# Patient Record
Sex: FEMALE | Race: BLACK OR AFRICAN AMERICAN | NOT HISPANIC OR LATINO | Employment: FULL TIME | URBAN - METROPOLITAN AREA
[De-identification: names, ages, dates, MRNs, and addresses within clinical notes are randomized per-mention and may not be internally consistent; named-entity substitution may affect disease eponyms.]

---

## 2020-04-22 ENCOUNTER — OFFICE VISIT (OUTPATIENT)
Dept: FAMILY MEDICINE | Facility: CLINIC | Age: 65
End: 2020-04-22
Payer: MEDICARE

## 2020-04-22 VITALS
SYSTOLIC BLOOD PRESSURE: 148 MMHG | WEIGHT: 161 LBS | BODY MASS INDEX: 25.88 KG/M2 | OXYGEN SATURATION: 99 % | HEART RATE: 87 BPM | TEMPERATURE: 98 F | HEIGHT: 66 IN | DIASTOLIC BLOOD PRESSURE: 100 MMHG

## 2020-04-22 DIAGNOSIS — I10 ESSENTIAL HYPERTENSION: Primary | ICD-10-CM

## 2020-04-22 DIAGNOSIS — Z11.59 NEED FOR HEPATITIS C SCREENING TEST: ICD-10-CM

## 2020-04-22 PROCEDURE — 99203 PR OFFICE/OUTPT VISIT, NEW, LEVL III, 30-44 MIN: ICD-10-PCS | Mod: S$GLB,,, | Performed by: NURSE PRACTITIONER

## 2020-04-22 PROCEDURE — 99203 OFFICE O/P NEW LOW 30 MIN: CPT | Mod: S$GLB,,, | Performed by: NURSE PRACTITIONER

## 2020-04-22 RX ORDER — LOSARTAN POTASSIUM AND HYDROCHLOROTHIAZIDE 12.5; 1 MG/1; MG/1
1 TABLET ORAL DAILY
COMMUNITY
Start: 2020-01-20 | End: 2020-04-22 | Stop reason: SDUPTHER

## 2020-04-22 RX ORDER — LOSARTAN POTASSIUM AND HYDROCHLOROTHIAZIDE 12.5; 1 MG/1; MG/1
1 TABLET ORAL DAILY
Qty: 30 TABLET | Refills: 1 | Status: SHIPPED | OUTPATIENT
Start: 2020-04-22 | End: 2020-05-11

## 2020-04-22 NOTE — PROGRESS NOTES
SUBJECTIVE:      Patient ID: Delgado Espinoza is a 64 y.o. female.    Chief Complaint: No chief complaint on file.    Patient is here today to establish care. She has a history of htn. Has not been seen by a PCP in 2 years. She was on blood pressure medication in the past. Lost weight and did not feel she needed the medication anymore. She was monitoring her blood pressure at home. Over the past 6 months she gained a lot of her weight back and noticed her blood pressure was running high. Went to an urgent care 3 months ago and was prescribed hyzaar which is what she was on in the past. She has been out of the medication for 3 days and needs refills. She is due for her pap, mammo and DEXA. Also due for routine labs    Hypertension   This is a chronic problem. The current episode started more than 1 year ago. Pertinent negatives include no anxiety, chest pain, headaches, malaise/fatigue, orthopnea, palpitations, peripheral edema or shortness of breath. Past treatments include angiotensin blockers and diuretics. The current treatment provides moderate improvement.       Past Surgical History:   Procedure Laterality Date    BREAST SURGERY      MYOMECTOMY      RIGHT OOPHORECTOMY       Family History   Problem Relation Age of Onset    Stroke Mother       Social History     Socioeconomic History    Marital status: Single     Spouse name: Not on file    Number of children: Not on file    Years of education: Not on file    Highest education level: Not on file   Occupational History    Not on file   Social Needs    Financial resource strain: Not on file    Food insecurity:     Worry: Not on file     Inability: Not on file    Transportation needs:     Medical: Not on file     Non-medical: Not on file   Tobacco Use    Smoking status: Never Smoker    Smokeless tobacco: Never Used   Substance and Sexual Activity    Alcohol use: Yes     Comment: socially    Drug use: No    Sexual activity: Not on file    Lifestyle    Physical activity:     Days per week: Not on file     Minutes per session: Not on file    Stress: Not on file   Relationships    Social connections:     Talks on phone: Not on file     Gets together: Not on file     Attends Mandaeism service: Not on file     Active member of club or organization: Not on file     Attends meetings of clubs or organizations: Not on file     Relationship status: Not on file   Other Topics Concern    Not on file   Social History Narrative    Not on file     Current Outpatient Medications   Medication Sig Dispense Refill    aspirin 81 MG Chew Take 81 mg by mouth once daily.      calcium-vitamin D3 (CALCIUM 500 + D) 500 mg(1,250mg) -200 unit per tablet Take 1 tablet by mouth 2 (two) times daily with meals.      fish oil-omega-3 fatty acids 300-1,000 mg capsule Take 2 g by mouth once daily.      losartan-hydrochlorothiazide 100-12.5 mg (HYZAAR) 100-12.5 mg Tab Take 1 tablet by mouth once daily. 30 tablet 1     No current facility-administered medications for this visit.      Review of patient's allergies indicates:  No Known Allergies   Past Medical History:   Diagnosis Date    Hypertension      Past Surgical History:   Procedure Laterality Date    BREAST SURGERY      MYOMECTOMY      RIGHT OOPHORECTOMY         Review of Systems   Constitutional: Negative for appetite change, chills, diaphoresis, malaise/fatigue and unexpected weight change.   HENT: Negative for ear discharge, hearing loss, trouble swallowing and voice change.    Eyes: Negative for photophobia and pain.   Respiratory: Negative for chest tightness, shortness of breath and stridor.    Cardiovascular: Negative for chest pain, palpitations and orthopnea.   Gastrointestinal: Negative for abdominal pain, blood in stool and vomiting.   Endocrine: Negative for cold intolerance and heat intolerance.   Genitourinary: Negative for difficulty urinating and flank pain.   Musculoskeletal: Negative for  "arthralgias, joint swelling and neck stiffness.   Skin: Negative for pallor.   Neurological: Negative for dizziness, speech difficulty, weakness, light-headedness and headaches.   Hematological: Does not bruise/bleed easily.   Psychiatric/Behavioral: Negative for confusion, dysphoric mood and sleep disturbance. The patient is not nervous/anxious.       OBJECTIVE:      Vitals:    04/22/20 1442 04/22/20 1519   BP: (!) 200/108 (!) 148/100   Pulse: 87    Temp: 98.4 °F (36.9 °C)    TempSrc: Oral    SpO2: 99%    Weight: 73 kg (161 lb)    Height: 5' 6" (1.676 m)      Physical Exam   Constitutional: She is oriented to person, place, and time. She appears well-developed and well-nourished.   HENT:   Head: Atraumatic.   Eyes: Conjunctivae are normal.   Neck: Neck supple. No JVD present. Carotid bruit is not present. No thyromegaly present.   Cardiovascular: Normal rate, regular rhythm, normal heart sounds and intact distal pulses.   Pulmonary/Chest: Effort normal and breath sounds normal. She has no wheezes. She has no rhonchi. She has no rales.   Abdominal: Soft. Bowel sounds are normal. She exhibits no distension. There is no tenderness.   Musculoskeletal: Normal range of motion. She exhibits no edema.   Neurological: She is alert and oriented to person, place, and time.   Skin: Skin is warm and dry.   Psychiatric: She has a normal mood and affect. Her speech is normal and behavior is normal. Thought content normal.   Nursing note and vitals reviewed.     Assessment:       1. Essential hypertension    2. Need for hepatitis C screening test        Plan:       Essential hypertension  -     CBC auto differential; Future; Expected date: 04/22/2020  -     Comprehensive metabolic panel; Future; Expected date: 04/22/2020  -     Lipid panel; Future; Expected date: 04/22/2020  -     TSH; Future; Expected date: 04/22/2020  -     Urinalysis; Future; Expected date: 04/22/2020  -     losartan-hydrochlorothiazide 100-12.5 mg (HYZAAR) " 100-12.5 mg Tab; Take 1 tablet by mouth once daily.  Dispense: 30 tablet; Refill: 1    Need for hepatitis C screening test  -     Hepatitis C antibody; Future; Expected date: 04/22/2020    Patient states she would like to wait on her mammo and dexa until later in the year. She will look up physicians for GYN and let me know at her f/u visit who she prefers to see    Follow up in about 4 weeks (around 5/20/2020) for htn .      4/22/2020 KENNEY Castillo, FNP

## 2020-04-22 NOTE — PATIENT INSTRUCTIONS
Established High Blood Pressure    High blood pressure (hypertension) is a chronic disease. Often, healthcare providers dont know what causes it. But it can be caused by certain health conditions and medicines.  If you have high blood pressure, you may not have any symptoms. If you do have symptoms, they may include headache, dizziness, changes in your vision, chest pain, and shortness of breath. But even without symptoms, high blood pressure thats not treated raises your risk for heart attack and stroke. High blood pressure is a serious health risk and shouldnt be ignored.  A blood pressure reading is made up of two numbers: a higher number over a lower number. The top number is the systolic pressure. The bottom number is the diastolic pressure. A normal blood pressure is a systolic pressure of  less than 120 over a diastolic pressure of less than 80. You will see your blood pressure readings written together. For example, a person with a systolic pressure of 188 and a diastolic pressure of 78 will have 118/78 written in the medical record.  High blood pressure is when either the top number is 140 or higher, or the bottom number is 90 or higher. This must be the result when taking your blood pressure a number of times. The blood pressures between normal and high are called prehypertension.  Home care  If you have high blood pressure, you should do what is listed below to lower your blood pressure. If you are taking medicines for high blood pressure, these methods may reduce or end your need for medicines in the future.  · Begin a weight-loss program if you are overweight.  · Cut back on how much salt you get in your diet. Heres how to do this:  ¨ Dont eat foods that have a lot of salt. These include olives, pickles, smoked meats, and salted potato chips.  ¨ Dont add salt to your food at the table.  ¨ Use only small amounts of salt when cooking.  · Start an exercise program. Talk with your healthcare  provider about the type of exercise program that would be best for you. It doesn't have to be hard. Even brisk walking for 20 minutes 3 times a week is a good form of exercise.  · Dont take medicines that stimulate the heart. This includes many over-the-counter cold and sinus decongestant pills and sprays, as well as diet pills. Check the warnings about hypertension on the label. Before buying any over-the-counter medicines or supplements, always ask the pharmacist about the product's potential interaction with your high blood pressure and your high blood pressure medicines.  · Stimulants such as amphetamine or cocaine could be deadly for someone with high blood pressure. Never take these.  · Limit how much caffeine you get in your diet. Switch to caffeine-free products.  · Stop smoking. If you are a long-time smoker, this can be hard. Talk to your healthcare provider about medicines and nicotine replacement options to help you. Also, enroll in a stop-smoking program to make it more likely that you will quit for good.  · Learn how to handle stress. This is an important part of any program to lower blood pressure. Learn about relaxation methods like meditation, yoga, or biofeedback.  · If your provider prescribed medicines, take them exactly as directed. Missing doses may cause your blood pressure get out of control.  · If you miss a dose or doses, check with your healthcare provider or pharmacist about what to do.  · Consider buying an automatic blood pressure machine. Ask your provider for a recommendation. You can get one of these at most pharmacies.     The American Heart Association recommends the following guidelines for home blood pressure monitoring:  · Don't smoke or drink coffee for 30 minutes before taking your blood pressure.  · Go to the bathroom before the test.  · Relax for 5 minutes before taking the measurement.  · Sit with your back supported (don't sit on a couch or soft chair); keep your feet on  the floor uncrossed. Place your arm on a solid flat surface (like a table) with the upper part of the arm at heart level. Place the middle of the cuff directly above the eye of the elbow. Check the monitor's instruction manual for an illustration.  · Take multiple readings. When you measure, take 2 to 3 readings one minute apart and record all of the results.  · Take your blood pressure at the same time every day, or as your healthcare provider recommends.  · Record the date, time, and blood pressure reading.  · Take the record with you to your next medical appointment. If your blood pressure monitor has a built-in memory, simply take the monitor with you to your next appointment.  · Call your provider if you have several high readings. Don't be frightened by a single high blood pressure reading, but if you get several high readings, check in with your healthcare provider.  · Note: When blood pressure reaches a systolic (top number) of 180 or higher OR diastolic (bottom number) of 110 or higher, seek emergency medical treatment.  Follow-up care  You will need to see your healthcare provider regularly. This is to check your blood pressure and to make changes to your medicines. Make a follow-up appointment as directed. Bring the record of your home blood pressure readings to the appointment.  When to seek medical advice  Call your healthcare provider right away if any of these occur:  · Blood pressure reaches a systolic (upper number) of 180 or higher OR a diastolic (bottom number) of 110 or higher  · Chest pain or shortness of breath  · Severe headache  · Throbbing or rushing sound in the ears  · Nosebleed  · Sudden severe pain in your belly (abdomen)  · Extreme drowsiness, confusion, or fainting  · Dizziness or spinning sensation (vertigo)  · Weakness of an arm or leg or one side of the face  · You have problems speaking or seeing   Date Last Reviewed: 12/1/2016  © 4148-8882 [employer]. [address] [place] [address]  [place] [address]. All rights reserved. This information is not intended as a substitute for professional medical care. Always follow your healthcare professional's instructions.

## 2020-05-07 ENCOUNTER — TELEPHONE (OUTPATIENT)
Dept: FAMILY MEDICINE | Facility: CLINIC | Age: 65
End: 2020-05-07

## 2020-05-07 DIAGNOSIS — I10 ESSENTIAL HYPERTENSION: Primary | ICD-10-CM

## 2020-05-11 RX ORDER — LOSARTAN POTASSIUM AND HYDROCHLOROTHIAZIDE 25; 100 MG/1; MG/1
1 TABLET ORAL DAILY
Qty: 30 TABLET | Refills: 0 | Status: SHIPPED | OUTPATIENT
Start: 2020-05-11 | End: 2020-06-04

## 2020-05-11 NOTE — TELEPHONE ENCOUNTER
Pt said yes she agrees with changing blood pressure medication to 100/25mg.  Please send to local pharmacy.

## 2020-05-15 LAB
ALBUMIN SERPL-MCNC: 4.3 G/DL (ref 3.6–5.1)
ALBUMIN/GLOB SERPL: 1.3 (CALC) (ref 1–2.5)
ALP SERPL-CCNC: 88 U/L (ref 37–153)
ALT SERPL-CCNC: 15 U/L (ref 6–29)
APPEARANCE UR: CLEAR
AST SERPL-CCNC: 20 U/L (ref 10–35)
BASOPHILS # BLD AUTO: 51 CELLS/UL (ref 0–200)
BASOPHILS NFR BLD AUTO: 1.1 %
BILIRUB SERPL-MCNC: 0.4 MG/DL (ref 0.2–1.2)
BILIRUB UR QL STRIP: NEGATIVE
BUN SERPL-MCNC: 19 MG/DL (ref 7–25)
BUN/CREAT SERPL: NORMAL (CALC) (ref 6–22)
CALCIUM SERPL-MCNC: 10.4 MG/DL (ref 8.6–10.4)
CHLORIDE SERPL-SCNC: 103 MMOL/L (ref 98–110)
CHOLEST SERPL-MCNC: 171 MG/DL
CHOLEST/HDLC SERPL: 2.9 (CALC)
CO2 SERPL-SCNC: 29 MMOL/L (ref 20–32)
COLOR UR: YELLOW
CREAT SERPL-MCNC: 0.92 MG/DL (ref 0.5–0.99)
EOSINOPHIL # BLD AUTO: 557 CELLS/UL (ref 15–500)
EOSINOPHIL NFR BLD AUTO: 12.1 %
ERYTHROCYTE [DISTWIDTH] IN BLOOD BY AUTOMATED COUNT: 14.3 % (ref 11–15)
GFRSERPLBLD MDRD-ARVRAT: 65 ML/MIN/1.73M2
GLOBULIN SER CALC-MCNC: 3.3 G/DL (CALC) (ref 1.9–3.7)
GLUCOSE SERPL-MCNC: 87 MG/DL (ref 65–99)
GLUCOSE UR QL STRIP: NEGATIVE
HCT VFR BLD AUTO: 41.8 % (ref 35–45)
HCV AB S/CO SERPL IA: 0.01
HCV AB SERPL QL IA: NORMAL
HDLC SERPL-MCNC: 60 MG/DL
HGB BLD-MCNC: 13.1 G/DL (ref 11.7–15.5)
HGB UR QL STRIP: NEGATIVE
KETONES UR QL STRIP: NEGATIVE
LDLC SERPL CALC-MCNC: 97 MG/DL (CALC)
LEUKOCYTE ESTERASE UR QL STRIP: NEGATIVE
LYMPHOCYTES # BLD AUTO: 1431 CELLS/UL (ref 850–3900)
LYMPHOCYTES NFR BLD AUTO: 31.1 %
MCH RBC QN AUTO: 25.5 PG (ref 27–33)
MCHC RBC AUTO-ENTMCNC: 31.3 G/DL (ref 32–36)
MCV RBC AUTO: 81.5 FL (ref 80–100)
MONOCYTES # BLD AUTO: 396 CELLS/UL (ref 200–950)
MONOCYTES NFR BLD AUTO: 8.6 %
NEUTROPHILS # BLD AUTO: 2167 CELLS/UL (ref 1500–7800)
NEUTROPHILS NFR BLD AUTO: 47.1 %
NITRITE UR QL STRIP: NEGATIVE
NONHDLC SERPL-MCNC: 111 MG/DL (CALC)
PH UR STRIP: 6.5 [PH] (ref 5–8)
PLATELET # BLD AUTO: 225 THOUSAND/UL (ref 140–400)
PMV BLD REES-ECKER: 12.6 FL (ref 7.5–12.5)
POTASSIUM SERPL-SCNC: 4 MMOL/L (ref 3.5–5.3)
PROT SERPL-MCNC: 7.6 G/DL (ref 6.1–8.1)
PROT UR QL STRIP: NEGATIVE
RBC # BLD AUTO: 5.13 MILLION/UL (ref 3.8–5.1)
SODIUM SERPL-SCNC: 140 MMOL/L (ref 135–146)
SP GR UR STRIP: 1.01 (ref 1–1.03)
TRIGL SERPL-MCNC: 48 MG/DL
TSH SERPL-ACNC: 3.66 MIU/L (ref 0.4–4.5)
WBC # BLD AUTO: 4.6 THOUSAND/UL (ref 3.8–10.8)

## 2020-06-04 DIAGNOSIS — I10 ESSENTIAL HYPERTENSION: ICD-10-CM

## 2020-06-04 RX ORDER — LOSARTAN POTASSIUM AND HYDROCHLOROTHIAZIDE 25; 100 MG/1; MG/1
TABLET ORAL
Qty: 30 TABLET | Refills: 0 | Status: SHIPPED | OUTPATIENT
Start: 2020-06-04 | End: 2020-06-11 | Stop reason: SDUPTHER

## 2020-06-11 ENCOUNTER — OFFICE VISIT (OUTPATIENT)
Dept: FAMILY MEDICINE | Facility: CLINIC | Age: 65
End: 2020-06-11
Payer: MEDICARE

## 2020-06-11 VITALS
WEIGHT: 160.38 LBS | SYSTOLIC BLOOD PRESSURE: 130 MMHG | HEART RATE: 65 BPM | HEIGHT: 66 IN | TEMPERATURE: 98 F | OXYGEN SATURATION: 98 % | BODY MASS INDEX: 25.78 KG/M2 | DIASTOLIC BLOOD PRESSURE: 88 MMHG

## 2020-06-11 DIAGNOSIS — I10 ESSENTIAL HYPERTENSION: Primary | ICD-10-CM

## 2020-06-11 DIAGNOSIS — M85.80 OSTEOPENIA, UNSPECIFIED LOCATION: ICD-10-CM

## 2020-06-11 DIAGNOSIS — Z78.0 POST-MENOPAUSAL: ICD-10-CM

## 2020-06-11 DIAGNOSIS — Z12.31 ENCOUNTER FOR SCREENING MAMMOGRAM FOR BREAST CANCER: ICD-10-CM

## 2020-06-11 PROCEDURE — 99213 OFFICE O/P EST LOW 20 MIN: CPT | Mod: S$GLB,,, | Performed by: NURSE PRACTITIONER

## 2020-06-11 PROCEDURE — 99213 PR OFFICE/OUTPT VISIT, EST, LEVL III, 20-29 MIN: ICD-10-PCS | Mod: S$GLB,,, | Performed by: NURSE PRACTITIONER

## 2020-06-11 RX ORDER — LOSARTAN POTASSIUM AND HYDROCHLOROTHIAZIDE 25; 100 MG/1; MG/1
1 TABLET ORAL DAILY
Qty: 90 TABLET | Refills: 1 | Status: SHIPPED | OUTPATIENT
Start: 2020-06-11 | End: 2020-12-10 | Stop reason: SDUPTHER

## 2020-06-11 NOTE — PATIENT INSTRUCTIONS
Established High Blood Pressure    High blood pressure (hypertension) is a chronic disease. Often, healthcare providers dont know what causes it. But it can be caused by certain health conditions and medicines.  If you have high blood pressure, you may not have any symptoms. If you do have symptoms, they may include headache, dizziness, changes in your vision, chest pain, and shortness of breath. But even without symptoms, high blood pressure thats not treated raises your risk for heart attack and stroke. High blood pressure is a serious health risk and shouldnt be ignored.  A blood pressure reading is made up of two numbers: a higher number over a lower number. The top number is the systolic pressure. The bottom number is the diastolic pressure. A normal blood pressure is a systolic pressure of  less than 120 over a diastolic pressure of less than 80. You will see your blood pressure readings written together. For example, a person with a systolic pressure of 188 and a diastolic pressure of 78 will have 118/78 written in the medical record.  High blood pressure is when either the top number is 140 or higher, or the bottom number is 90 or higher. This must be the result when taking your blood pressure a number of times. The blood pressures between normal and high are called prehypertension.  Home care  If you have high blood pressure, you should do what is listed below to lower your blood pressure. If you are taking medicines for high blood pressure, these methods may reduce or end your need for medicines in the future.  · Begin a weight-loss program if you are overweight.  · Cut back on how much salt you get in your diet. Heres how to do this:  ¨ Dont eat foods that have a lot of salt. These include olives, pickles, smoked meats, and salted potato chips.  ¨ Dont add salt to your food at the table.  ¨ Use only small amounts of salt when cooking.  · Start an exercise program. Talk with your healthcare  provider about the type of exercise program that would be best for you. It doesn't have to be hard. Even brisk walking for 20 minutes 3 times a week is a good form of exercise.  · Dont take medicines that stimulate the heart. This includes many over-the-counter cold and sinus decongestant pills and sprays, as well as diet pills. Check the warnings about hypertension on the label. Before buying any over-the-counter medicines or supplements, always ask the pharmacist about the product's potential interaction with your high blood pressure and your high blood pressure medicines.  · Stimulants such as amphetamine or cocaine could be deadly for someone with high blood pressure. Never take these.  · Limit how much caffeine you get in your diet. Switch to caffeine-free products.  · Stop smoking. If you are a long-time smoker, this can be hard. Talk to your healthcare provider about medicines and nicotine replacement options to help you. Also, enroll in a stop-smoking program to make it more likely that you will quit for good.  · Learn how to handle stress. This is an important part of any program to lower blood pressure. Learn about relaxation methods like meditation, yoga, or biofeedback.  · If your provider prescribed medicines, take them exactly as directed. Missing doses may cause your blood pressure get out of control.  · If you miss a dose or doses, check with your healthcare provider or pharmacist about what to do.  · Consider buying an automatic blood pressure machine. Ask your provider for a recommendation. You can get one of these at most pharmacies.     The American Heart Association recommends the following guidelines for home blood pressure monitoring:  · Don't smoke or drink coffee for 30 minutes before taking your blood pressure.  · Go to the bathroom before the test.  · Relax for 5 minutes before taking the measurement.  · Sit with your back supported (don't sit on a couch or soft chair); keep your feet on  the floor uncrossed. Place your arm on a solid flat surface (like a table) with the upper part of the arm at heart level. Place the middle of the cuff directly above the eye of the elbow. Check the monitor's instruction manual for an illustration.  · Take multiple readings. When you measure, take 2 to 3 readings one minute apart and record all of the results.  · Take your blood pressure at the same time every day, or as your healthcare provider recommends.  · Record the date, time, and blood pressure reading.  · Take the record with you to your next medical appointment. If your blood pressure monitor has a built-in memory, simply take the monitor with you to your next appointment.  · Call your provider if you have several high readings. Don't be frightened by a single high blood pressure reading, but if you get several high readings, check in with your healthcare provider.  · Note: When blood pressure reaches a systolic (top number) of 180 or higher OR diastolic (bottom number) of 110 or higher, seek emergency medical treatment.  Follow-up care  You will need to see your healthcare provider regularly. This is to check your blood pressure and to make changes to your medicines. Make a follow-up appointment as directed. Bring the record of your home blood pressure readings to the appointment.  When to seek medical advice  Call your healthcare provider right away if any of these occur:  · Blood pressure reaches a systolic (upper number) of 180 or higher OR a diastolic (bottom number) of 110 or higher  · Chest pain or shortness of breath  · Severe headache  · Throbbing or rushing sound in the ears  · Nosebleed  · Sudden severe pain in your belly (abdomen)  · Extreme drowsiness, confusion, or fainting  · Dizziness or spinning sensation (vertigo)  · Weakness of an arm or leg or one side of the face  · You have problems speaking or seeing   Date Last Reviewed: 12/1/2016  © 0419-9161 [employer]. [address] [place] [address]  [place] [address]. All rights reserved. This information is not intended as a substitute for professional medical care. Always follow your healthcare professional's instructions.

## 2020-06-11 NOTE — PROGRESS NOTES
SUBJECTIVE:      Patient ID: Delgado Espinoza is a 64 y.o. female.    Chief Complaint: Hypertension    Patient is here today to f/u on htn and review labs. She is doing well on her current meds, no complaints    Hypertension   This is a chronic problem. The current episode started more than 1 year ago. The problem has been gradually improving since onset. The problem is controlled. Pertinent negatives include no anxiety, chest pain, headaches, malaise/fatigue, orthopnea, palpitations, peripheral edema or shortness of breath. Past treatments include angiotensin blockers and diuretics. The current treatment provides moderate improvement.       Past Surgical History:   Procedure Laterality Date    BREAST SURGERY      MYOMECTOMY      RIGHT OOPHORECTOMY       Family History   Problem Relation Age of Onset    Stroke Mother       Social History     Socioeconomic History    Marital status: Single     Spouse name: Not on file    Number of children: Not on file    Years of education: Not on file    Highest education level: Not on file   Occupational History    Not on file   Social Needs    Financial resource strain: Not on file    Food insecurity:     Worry: Not on file     Inability: Not on file    Transportation needs:     Medical: Not on file     Non-medical: Not on file   Tobacco Use    Smoking status: Never Smoker    Smokeless tobacco: Never Used   Substance and Sexual Activity    Alcohol use: Yes     Comment: socially    Drug use: No    Sexual activity: Not on file   Lifestyle    Physical activity:     Days per week: Not on file     Minutes per session: Not on file    Stress: Not on file   Relationships    Social connections:     Talks on phone: Not on file     Gets together: Not on file     Attends Spiritism service: Not on file     Active member of club or organization: Not on file     Attends meetings of clubs or organizations: Not on file     Relationship status: Not on file   Other Topics  Concern    Not on file   Social History Narrative    Not on file     Current Outpatient Medications   Medication Sig Dispense Refill    aspirin 81 MG Chew Take 81 mg by mouth once daily.      calcium-vitamin D3 (CALCIUM 500 + D) 500 mg(1,250mg) -200 unit per tablet Take 1 tablet by mouth 2 (two) times daily with meals.      fish oil-omega-3 fatty acids 300-1,000 mg capsule Take 2 g by mouth once daily.      losartan-hydrochlorothiazide 100-25 mg (HYZAAR) 100-25 mg per tablet Take 1 tablet by mouth once daily. 90 tablet 1     No current facility-administered medications for this visit.      Review of patient's allergies indicates:  No Known Allergies   Past Medical History:   Diagnosis Date    Hypertension      Past Surgical History:   Procedure Laterality Date    BREAST SURGERY      MYOMECTOMY      RIGHT OOPHORECTOMY         Review of Systems   Constitutional: Negative for appetite change, chills, diaphoresis, malaise/fatigue and unexpected weight change.   HENT: Negative for ear discharge, hearing loss, trouble swallowing and voice change.    Eyes: Negative for photophobia and pain.   Respiratory: Negative for chest tightness, shortness of breath and stridor.    Cardiovascular: Negative for chest pain, palpitations and orthopnea.   Gastrointestinal: Negative for abdominal pain, blood in stool and vomiting.   Endocrine: Negative for cold intolerance and heat intolerance.   Genitourinary: Negative for difficulty urinating and flank pain.   Musculoskeletal: Negative for joint swelling and neck stiffness.   Skin: Negative for pallor.   Neurological: Negative for dizziness, speech difficulty, weakness and headaches.   Hematological: Does not bruise/bleed easily.   Psychiatric/Behavioral: Negative for confusion and dysphoric mood. The patient is not nervous/anxious.       OBJECTIVE:      Vitals:    06/11/20 1329 06/11/20 1400   BP: (!) 146/90 130/88   Pulse: 65    Temp: 98.4 °F (36.9 °C)    TempSrc: Oral   "  SpO2: 98%    Weight: 72.8 kg (160 lb 6.4 oz)    Height: 5' 6" (1.676 m)      Physical Exam   Constitutional: She is oriented to person, place, and time. She appears well-developed and well-nourished.   HENT:   Head: Atraumatic.   Eyes: Conjunctivae are normal.   Neck: Neck supple. No JVD present. No thyromegaly present.   Cardiovascular: Normal rate, regular rhythm, normal heart sounds and intact distal pulses.   Pulmonary/Chest: Effort normal and breath sounds normal.   Abdominal: Soft. Bowel sounds are normal. She exhibits no distension. There is no tenderness.   Musculoskeletal: Normal range of motion. She exhibits no edema.   Neurological: She is alert and oriented to person, place, and time.   Skin: Skin is warm and dry.   Psychiatric: She has a normal mood and affect. Her behavior is normal. Thought content normal.   Nursing note and vitals reviewed.      No visits with results within 1 Month(s) from this visit.   Latest known visit with results is:   Office Visit on 04/22/2020   Component Date Value Ref Range Status    WBC 05/14/2020 4.6  3.8 - 10.8 Thousand/uL Final    RBC 05/14/2020 5.13* 3.80 - 5.10 Million/uL Final    Hemoglobin 05/14/2020 13.1  11.7 - 15.5 g/dL Final    Hematocrit 05/14/2020 41.8  35.0 - 45.0 % Final    Mean Corpuscular Volume 05/14/2020 81.5  80.0 - 100.0 fL Final    Mean Corpuscular Hemoglobin 05/14/2020 25.5* 27.0 - 33.0 pg Final    Mean Corpuscular Hemoglobin Conc 05/14/2020 31.3* 32.0 - 36.0 g/dL Final    RDW 05/14/2020 14.3  11.0 - 15.0 % Final    Platelets 05/14/2020 225  140 - 400 Thousand/uL Final    MPV 05/14/2020 12.6* 7.5 - 12.5 fL Final    Neutrophils Absolute 05/14/2020 2,167  1,500 - 7,800 cells/uL Final    Lymph # 05/14/2020 1,431  850 - 3,900 cells/uL Final    Mono # 05/14/2020 396  200 - 950 cells/uL Final    Eos # 05/14/2020 557* 15 - 500 cells/uL Final    Baso # 05/14/2020 51  0 - 200 cells/uL Final    Neutrophils Relative 05/14/2020 47.1  % Final "    Lymph% 05/14/2020 31.1  % Final    Mono% 05/14/2020 8.6  % Final    Eosinophil% 05/14/2020 12.1  % Final    Basophil% 05/14/2020 1.1  % Final    Glucose 05/14/2020 87  65 - 99 mg/dL Final    Comment:               Fasting reference interval         BUN, Bld 05/14/2020 19  7 - 25 mg/dL Final    Creatinine 05/14/2020 0.92  0.50 - 0.99 mg/dL Final    Comment: For patients >49 years of age, the reference limit  for Creatinine is approximately 13% higher for people  identified as -American.         eGFR if non African American 05/14/2020 65  > OR = 60 mL/min/1.73m2 Final    eGFR if African American 05/14/2020 76  > OR = 60 mL/min/1.73m2 Final    BUN/Creatinine Ratio 05/14/2020 NOT APPLICABLE  6 - 22 (calc) Final    Sodium 05/14/2020 140  135 - 146 mmol/L Final    Potassium 05/14/2020 4.0  3.5 - 5.3 mmol/L Final    Chloride 05/14/2020 103  98 - 110 mmol/L Final    CO2 05/14/2020 29  20 - 32 mmol/L Final    Calcium 05/14/2020 10.4  8.6 - 10.4 mg/dL Final    Total Protein 05/14/2020 7.6  6.1 - 8.1 g/dL Final    Albumin 05/14/2020 4.3  3.6 - 5.1 g/dL Final    Globulin, Total 05/14/2020 3.3  1.9 - 3.7 g/dL (calc) Final    Albumin/Globulin Ratio 05/14/2020 1.3  1.0 - 2.5 (calc) Final    Total Bilirubin 05/14/2020 0.4  0.2 - 1.2 mg/dL Final    Alkaline Phosphatase 05/14/2020 88  37 - 153 U/L Final    AST 05/14/2020 20  10 - 35 U/L Final    ALT 05/14/2020 15  6 - 29 U/L Final    TSH 05/14/2020 3.66  0.40 - 4.50 mIU/L Final    Hepatitis C Ab 05/14/2020 NON-REACTIVE  NON-REACTIVE Final    Signal/Cutoff 05/14/2020 0.01  <1.00 Final    Comment:    HCV antibody was non-reactive. There is no laboratory   evidence of HCV infection.     In most cases, no further action is required. However,  if recent HCV exposure is suspected, a test for HCV RNA  (test code 52898) is suggested.     For additional information please refer to  http://education.[employer]/faq/SXQ57z8  (This link is being  provided for informational/  educational purposes only.)         Cholesterol 05/14/2020 171  <200 mg/dL Final    HDL 05/14/2020 60  > OR = 50 mg/dL Final    Triglycerides 05/14/2020 48  <150 mg/dL Final    LDL Cholesterol 05/14/2020 97  mg/dL (calc) Final    Comment: Reference range: <100     Desirable range <100 mg/dL for primary prevention;    <70 mg/dL for patients with CHD or diabetic patients   with > or = 2 CHD risk factors.     LDL-C is now calculated using the Kentrell   calculation, which is a validated novel method providing   better accuracy than the Friedewald equation in the   estimation of LDL-C.   Luis Armando BRENNER et al. GUI. 2013;310(19): 5518-7741   (http://education.Storrz/faq/AGY568)      Hdl/Cholesterol Ratio 05/14/2020 2.9  <5.0 (calc) Final    Non HDL Chol. (LDL+VLDL) 05/14/2020 111  <130 mg/dL (calc) Final    Comment: For patients with diabetes plus 1 major ASCVD risk   factor, treating to a non-HDL-C goal of <100 mg/dL   (LDL-C of <70 mg/dL) is considered a therapeutic   option.      Color, UA 05/14/2020 YELLOW  YELLOW Final    Appearance, UA 05/14/2020 CLEAR  CLEAR Final    Specific Gravity, UA 05/14/2020 1.014  1.001 - 1.035 Final    pH, UA 05/14/2020 6.5  5.0 - 8.0 Final    Glucose, UA 05/14/2020 NEGATIVE  NEGATIVE Final    Bilirubin, UA 05/14/2020 NEGATIVE  NEGATIVE Final    Ketones, UA 05/14/2020 NEGATIVE  NEGATIVE Final    Occult Blood UA 05/14/2020 NEGATIVE  NEGATIVE Final    Protein, UA 05/14/2020 NEGATIVE  NEGATIVE Final    Nitrite, UA 05/14/2020 NEGATIVE  NEGATIVE Final    Leukocytes, UA 05/14/2020 NEGATIVE  NEGATIVE Final   ]  Assessment:       1. Essential hypertension    2. Post-menopausal    3. Osteopenia, unspecified location    4. Encounter for screening mammogram for breast cancer        Plan:       Essential hypertension  -     losartan-hydrochlorothiazide 100-25 mg (HYZAAR) 100-25 mg per tablet; Take 1 tablet by mouth once daily.  Dispense: 90  tablet; Refill: 1    Post-menopausal  -     DXA Bone Density Spine And Hip; Future; Expected date: 06/11/2020    Osteopenia, unspecified location  -     DXA Bone Density Spine And Hip; Future; Expected date: 06/11/2020    Encounter for screening mammogram for breast cancer  -     Mammo Digital Screening Bilat with CAD; Future; Expected date: 06/11/2020        Follow up in about 6 months (around 12/11/2020) for htn.      6/11/2020 Malcom Vigil, KENNEY, FNP

## 2020-11-19 ENCOUNTER — HOSPITAL ENCOUNTER (OUTPATIENT)
Dept: RADIOLOGY | Facility: HOSPITAL | Age: 65
Discharge: HOME | End: 2020-11-19
Attending: NURSE PRACTITIONER
Payer: MEDICARE

## 2020-11-19 ENCOUNTER — TELEPHONE (OUTPATIENT)
Dept: FAMILY MEDICINE | Facility: CLINIC | Age: 65
End: 2020-11-19

## 2020-11-19 DIAGNOSIS — M85.80 OSTEOPENIA, UNSPECIFIED LOCATION: ICD-10-CM

## 2020-11-19 DIAGNOSIS — Z12.31 ENCOUNTER FOR SCREENING MAMMOGRAM FOR BREAST CANCER: ICD-10-CM

## 2020-11-19 DIAGNOSIS — Z78.0 POST-MENOPAUSAL: ICD-10-CM

## 2020-11-19 PROCEDURE — 77080 DXA BONE DENSITY AXIAL: CPT | Mod: TC,PO

## 2020-11-19 PROCEDURE — 77067 SCR MAMMO BI INCL CAD: CPT | Mod: TC,PO

## 2020-11-24 ENCOUNTER — TELEPHONE (OUTPATIENT)
Dept: FAMILY MEDICINE | Facility: CLINIC | Age: 65
End: 2020-11-24

## 2020-11-24 DIAGNOSIS — N63.20 MASS OF LEFT BREAST: Primary | ICD-10-CM

## 2020-11-24 DIAGNOSIS — N64.89 OTHER SPECIFIED DISORDERS OF BREAST: ICD-10-CM

## 2020-11-24 NOTE — TELEPHONE ENCOUNTER
----- Message from Malcom Vigil NP sent at 11/24/2020  9:21 AM CST -----  Radiologist is recommending a diagnostic mammogram with ultrasound of the left breast

## 2020-12-10 ENCOUNTER — OFFICE VISIT (OUTPATIENT)
Dept: FAMILY MEDICINE | Facility: CLINIC | Age: 65
End: 2020-12-10
Payer: MEDICARE

## 2020-12-10 VITALS
TEMPERATURE: 98 F | BODY MASS INDEX: 26.36 KG/M2 | OXYGEN SATURATION: 99 % | HEART RATE: 101 BPM | SYSTOLIC BLOOD PRESSURE: 132 MMHG | DIASTOLIC BLOOD PRESSURE: 82 MMHG | WEIGHT: 164 LBS | HEIGHT: 66 IN

## 2020-12-10 DIAGNOSIS — I10 ESSENTIAL HYPERTENSION: Primary | ICD-10-CM

## 2020-12-10 PROCEDURE — 99213 OFFICE O/P EST LOW 20 MIN: CPT | Mod: S$GLB,,, | Performed by: NURSE PRACTITIONER

## 2020-12-10 PROCEDURE — 99213 PR OFFICE/OUTPT VISIT, EST, LEVL III, 20-29 MIN: ICD-10-PCS | Mod: S$GLB,,, | Performed by: NURSE PRACTITIONER

## 2020-12-10 RX ORDER — MULTIVITAMIN
1 TABLET ORAL DAILY
COMMUNITY

## 2020-12-10 RX ORDER — LOSARTAN POTASSIUM AND HYDROCHLOROTHIAZIDE 25; 100 MG/1; MG/1
1 TABLET ORAL DAILY
Qty: 90 TABLET | Refills: 1 | Status: SHIPPED | OUTPATIENT
Start: 2020-12-10 | End: 2021-06-17 | Stop reason: SDUPTHER

## 2020-12-10 NOTE — PROGRESS NOTES
SUBJECTIVE:      Patient ID: Delgado Espinoza is a 64 y.o. female.    Chief Complaint: Hypertension    Patient is here today to f/u on htn. She is doing well on her current meds, no complaints. She is working in her yard, cuts her own grass and goes to aerobic class. .Deneis chest pain or sob. UTD with her DEXA and mammo. Has a diagnostic mammo scheduled in 2 weeks.     Hypertension  This is a chronic problem. The current episode started more than 1 year ago. The problem is unchanged. The problem is controlled. Pertinent negatives include no anxiety, chest pain, headaches, malaise/fatigue, orthopnea, palpitations, peripheral edema or shortness of breath. Past treatments include angiotensin blockers and diuretics. The current treatment provides moderate improvement.       Past Surgical History:   Procedure Laterality Date    AUGMENTATION OF BREAST      BREAST BIOPSY      BREAST CYST EXCISION      BREAST SURGERY      MYOMECTOMY      RIGHT OOPHORECTOMY       Family History   Problem Relation Age of Onset    Stroke Mother       Social History     Socioeconomic History    Marital status: Single     Spouse name: Not on file    Number of children: Not on file    Years of education: Not on file    Highest education level: Not on file   Occupational History    Not on file   Social Needs    Financial resource strain: Not on file    Food insecurity     Worry: Not on file     Inability: Not on file    Transportation needs     Medical: Not on file     Non-medical: Not on file   Tobacco Use    Smoking status: Never Smoker    Smokeless tobacco: Never Used   Substance and Sexual Activity    Alcohol use: Yes     Comment: socially    Drug use: No    Sexual activity: Not on file   Lifestyle    Physical activity     Days per week: Not on file     Minutes per session: Not on file    Stress: Not on file   Relationships    Social connections     Talks on phone: Not on file     Gets together: Not on file      Attends Congregation service: Not on file     Active member of club or organization: Not on file     Attends meetings of clubs or organizations: Not on file     Relationship status: Not on file   Other Topics Concern    Not on file   Social History Narrative    Not on file     Current Outpatient Medications   Medication Sig Dispense Refill    aspirin 81 MG Chew Take 81 mg by mouth once daily.      calcium-vitamin D3 (CALCIUM 500 + D) 500 mg(1,250mg) -200 unit per tablet Take 1 tablet by mouth 2 (two) times daily with meals.      fish oil-omega-3 fatty acids 300-1,000 mg capsule Take 2 g by mouth once daily.      losartan-hydrochlorothiazide 100-25 mg (HYZAAR) 100-25 mg per tablet Take 1 tablet by mouth once daily. 90 tablet 1    multivitamin (THERAGRAN) per tablet Take 1 tablet by mouth once daily.       No current facility-administered medications for this visit.      Review of patient's allergies indicates:  No Known Allergies   Past Medical History:   Diagnosis Date    Hypertension      Past Surgical History:   Procedure Laterality Date    AUGMENTATION OF BREAST      BREAST BIOPSY      BREAST CYST EXCISION      BREAST SURGERY      MYOMECTOMY      RIGHT OOPHORECTOMY         Review of Systems   Constitutional: Negative for appetite change, chills, diaphoresis, malaise/fatigue and unexpected weight change.   HENT: Negative for ear discharge, hearing loss, trouble swallowing and voice change.    Eyes: Negative for photophobia and pain.   Respiratory: Negative for chest tightness, shortness of breath and stridor.    Cardiovascular: Negative for chest pain, palpitations and orthopnea.   Gastrointestinal: Negative for abdominal pain, blood in stool and vomiting.   Endocrine: Negative for cold intolerance and heat intolerance.   Genitourinary: Negative for difficulty urinating and flank pain.   Musculoskeletal: Negative for joint swelling and neck stiffness.   Skin: Negative for pallor.   Neurological:  "Negative for dizziness, speech difficulty, weakness and headaches.   Hematological: Does not bruise/bleed easily.   Psychiatric/Behavioral: Negative for confusion and dysphoric mood. The patient is not nervous/anxious.       OBJECTIVE:      Vitals:    12/10/20 1404 12/10/20 1428   BP: (!) 150/90 132/82   Pulse: 101    Temp: 98.1 °F (36.7 °C)    TempSrc: Skin    SpO2: 99%    Weight: 74.4 kg (164 lb)    Height: 5' 6" (1.676 m)      Physical Exam  Vitals signs and nursing note reviewed.   Constitutional:       General: She is not in acute distress.     Appearance: She is well-developed.   HENT:      Head: Normocephalic and atraumatic.      Right Ear: Tympanic membrane normal.      Left Ear: Tympanic membrane normal.      Nose: Nose normal.      Mouth/Throat:      Pharynx: Uvula midline.   Eyes:      General: Lids are normal.      Conjunctiva/sclera: Conjunctivae normal.      Pupils: Pupils are equal, round, and reactive to light.      Right eye: Pupil is round and reactive.      Left eye: Pupil is round and reactive.   Neck:      Musculoskeletal: Normal range of motion and neck supple.      Thyroid: No thyromegaly.      Vascular: No JVD.      Trachea: Trachea normal.   Cardiovascular:      Rate and Rhythm: Normal rate and regular rhythm.      Pulses: Normal pulses.      Heart sounds: Normal heart sounds.   Pulmonary:      Effort: Pulmonary effort is normal. No tachypnea or respiratory distress.      Breath sounds: Normal breath sounds.   Abdominal:      General: Bowel sounds are normal.      Palpations: Abdomen is soft.      Tenderness: There is no abdominal tenderness.   Musculoskeletal: Normal range of motion.   Lymphadenopathy:      Cervical: No cervical adenopathy.   Skin:     General: Skin is warm and dry.      Findings: No rash.   Neurological:      Mental Status: She is alert and oriented to person, place, and time.   Psychiatric:         Mood and Affect: Mood normal.         Speech: Speech normal.         " Behavior: Behavior normal. Behavior is cooperative.         Thought Content: Thought content normal.        Assessment:       1. Essential hypertension        Plan:       Essential hypertension  -     losartan-hydrochlorothiazide 100-25 mg (HYZAAR) 100-25 mg per tablet; Take 1 tablet by mouth once daily.  Dispense: 90 tablet; Refill: 1  -     Comprehensive Metabolic Panel; Future; Expected date: 12/24/2020  -     CBC Auto Differential; Future; Expected date: 12/24/2020        Follow up in about 6 months (around 6/10/2021) for htn.      12/10/2020 KENNEY Castillo, FNP

## 2020-12-10 NOTE — PATIENT INSTRUCTIONS
4 Steps for Eating Healthier  Changing the way you eat can improve your health. It can lower your cholesterol and blood pressure, and help you stay at a healthy weight. Your diet doesnt have to be bland and boring to be healthy. Just watch your calories and follow these steps:    1. Eat fewer unhealthy fats  · Choose more fish and lean meats instead of fatty cuts of meat.  · Skip butter and lard, and use less margarine.  · Pass on foods that have palm, coconut, or hydrogenated oils.  · Eat fewer high-fat dairy foods like cheese, ice cream, and whole milk.  · Get a heart-healthy cookbook and try some low-fat recipes.  2. Go light on salt  · Keep the saltshaker off the table.  · Limit high-salt ingredients, such as soy sauce, bouillon, and garlic salt.  · Instead of adding salt when cooking, season your food with herbs and flavorings. Try lemon, garlic, and onion.  · Limit convenience foods, such as boxed or canned foods and restaurant food.  · Read food labels and choose lower-sodium options.  3. Limit sugar  · Pause before you add sugars to pancakes, cereal, coffee, or tea. This includes white and brown table sugar, syrup, honey, and molasses. Cut your usual amount by half.  · Use non-sugar sweeteners. Stevia, aspartame, and sucralose can satisfy a sweet tooth without adding calories.  · Swap out sugar-filled soda and other drinks. Buy sugar-free or low-calorie beverages. Remember water is always the best choice.  · Read labels and choose foods with less added sugar. Keep in mind that dairy foods and foods with fruit will have some natural sugar.  · Cut the sugar in recipes by 1/3 to 1/2. Boost the flavor with extracts like almond, vanilla, or orange. Or add spices such as cinnamon or nutmeg.  4. Eat more fiber  · Eat fresh fruits and vegetables every day.  · Boost your diet with whole grains. Go for oats, whole-grain rice, and bran.  · Add beans and lentils to your meals.  · Drink more water to match your fiber  increase. This is to help prevent constipation.  Date Last Reviewed: 5/11/2015  © 1386-4055 [employer]. [address] [place] [address], [place] [address]. All rights reserved. This information is not intended as a substitute for professional medical care. Always follow your healthcare professional's instructions.

## 2020-12-22 ENCOUNTER — TELEPHONE (OUTPATIENT)
Dept: FAMILY MEDICINE | Facility: CLINIC | Age: 65
End: 2020-12-22

## 2020-12-22 DIAGNOSIS — I10 ESSENTIAL HYPERTENSION: Primary | ICD-10-CM

## 2020-12-22 LAB
ALBUMIN SERPL-MCNC: 4.4 G/DL (ref 3.6–5.1)
ALBUMIN/GLOB SERPL: 1.3 (CALC) (ref 1–2.5)
ALP SERPL-CCNC: 75 U/L (ref 37–153)
ALT SERPL-CCNC: 16 U/L (ref 6–29)
AST SERPL-CCNC: 22 U/L (ref 10–35)
BASOPHILS # BLD AUTO: 59 CELLS/UL (ref 0–200)
BASOPHILS NFR BLD AUTO: 0.9 %
BILIRUB SERPL-MCNC: 0.5 MG/DL (ref 0.2–1.2)
BUN SERPL-MCNC: 16 MG/DL (ref 7–25)
BUN/CREAT SERPL: ABNORMAL (CALC) (ref 6–22)
CALCIUM SERPL-MCNC: 11.4 MG/DL (ref 8.6–10.4)
CHLORIDE SERPL-SCNC: 108 MMOL/L (ref 98–110)
CO2 SERPL-SCNC: 30 MMOL/L (ref 20–32)
CREAT SERPL-MCNC: 0.87 MG/DL (ref 0.5–0.99)
EOSINOPHIL # BLD AUTO: 455 CELLS/UL (ref 15–500)
EOSINOPHIL NFR BLD AUTO: 6.9 %
ERYTHROCYTE [DISTWIDTH] IN BLOOD BY AUTOMATED COUNT: 14.5 % (ref 11–15)
GFRSERPLBLD MDRD-ARVRAT: 69 ML/MIN/1.73M2
GLOBULIN SER CALC-MCNC: 3.3 G/DL (CALC) (ref 1.9–3.7)
GLUCOSE SERPL-MCNC: 92 MG/DL (ref 65–99)
HCT VFR BLD AUTO: 40.4 % (ref 35–45)
HGB BLD-MCNC: 13.2 G/DL (ref 11.7–15.5)
LYMPHOCYTES # BLD AUTO: 1610 CELLS/UL (ref 850–3900)
LYMPHOCYTES NFR BLD AUTO: 24.4 %
MCH RBC QN AUTO: 25.9 PG (ref 27–33)
MCHC RBC AUTO-ENTMCNC: 32.7 G/DL (ref 32–36)
MCV RBC AUTO: 79.4 FL (ref 80–100)
MONOCYTES # BLD AUTO: 713 CELLS/UL (ref 200–950)
MONOCYTES NFR BLD AUTO: 10.8 %
NEUTROPHILS # BLD AUTO: 3762 CELLS/UL (ref 1500–7800)
NEUTROPHILS NFR BLD AUTO: 57 %
PLATELET # BLD AUTO: 247 THOUSAND/UL (ref 140–400)
PMV BLD REES-ECKER: 12.2 FL (ref 7.5–12.5)
POTASSIUM SERPL-SCNC: 4.2 MMOL/L (ref 3.5–5.3)
PROT SERPL-MCNC: 7.7 G/DL (ref 6.1–8.1)
RBC # BLD AUTO: 5.09 MILLION/UL (ref 3.8–5.1)
SODIUM SERPL-SCNC: 143 MMOL/L (ref 135–146)
WBC # BLD AUTO: 6.6 THOUSAND/UL (ref 3.8–10.8)

## 2020-12-22 NOTE — TELEPHONE ENCOUNTER
Pt notified of elevated calcium level and to stop calcium supplement. Verbalized understanding. Instructed pt to repeat BMP in 4 - 6 weeks at [facility].

## 2020-12-22 NOTE — TELEPHONE ENCOUNTER
----- Message from Malcom Vigil NP sent at 12/22/2020  7:33 AM CST -----  Calcium is elevated. Have her stop calcium supplements and repeat bmp in 4-6 weeks, cbc test ok

## 2020-12-24 ENCOUNTER — HOSPITAL ENCOUNTER (OUTPATIENT)
Dept: RADIOLOGY | Facility: HOSPITAL | Age: 65
Discharge: HOME | End: 2020-12-24
Attending: NURSE PRACTITIONER
Payer: MEDICARE

## 2020-12-24 ENCOUNTER — TELEPHONE (OUTPATIENT)
Dept: FAMILY MEDICINE | Facility: CLINIC | Age: 65
End: 2020-12-24

## 2020-12-24 DIAGNOSIS — N64.89 OTHER SPECIFIED DISORDERS OF BREAST: ICD-10-CM

## 2020-12-24 DIAGNOSIS — N63.20 MASS OF LEFT BREAST: ICD-10-CM

## 2020-12-24 PROCEDURE — 77061 BREAST TOMOSYNTHESIS UNI: CPT | Mod: TC,PO,LT

## 2020-12-24 PROCEDURE — 76642 ULTRASOUND BREAST LIMITED: CPT | Mod: TC,PO,LT

## 2020-12-24 PROCEDURE — 77065 DX MAMMO INCL CAD UNI: CPT | Mod: TC,PO,LT

## 2020-12-24 NOTE — TELEPHONE ENCOUNTER
----- Message from Malocm Vigil NP sent at 12/24/2020 11:31 AM CST -----  Benign diagnostic exam, annual mammo recommended

## 2021-01-21 ENCOUNTER — TELEPHONE (OUTPATIENT)
Dept: FAMILY MEDICINE | Facility: CLINIC | Age: 66
End: 2021-01-21

## 2021-01-21 LAB
BUN SERPL-MCNC: 30 MG/DL (ref 7–25)
BUN/CREAT SERPL: 31 (CALC) (ref 6–22)
CALCIUM SERPL-MCNC: 10.3 MG/DL (ref 8.6–10.4)
CHLORIDE SERPL-SCNC: 105 MMOL/L (ref 98–110)
CO2 SERPL-SCNC: 30 MMOL/L (ref 20–32)
CREAT SERPL-MCNC: 0.96 MG/DL (ref 0.5–0.99)
GFRSERPLBLD MDRD-ARVRAT: 62 ML/MIN/1.73M2
GLUCOSE SERPL-MCNC: 99 MG/DL (ref 65–99)
POTASSIUM SERPL-SCNC: 4.5 MMOL/L (ref 3.5–5.3)
SODIUM SERPL-SCNC: 140 MMOL/L (ref 135–146)

## 2021-04-29 ENCOUNTER — PATIENT MESSAGE (OUTPATIENT)
Dept: RESEARCH | Facility: HOSPITAL | Age: 66
End: 2021-04-29

## 2021-06-04 ENCOUNTER — TELEPHONE (OUTPATIENT)
Dept: FAMILY MEDICINE | Facility: CLINIC | Age: 66
End: 2021-06-04

## 2021-06-04 DIAGNOSIS — I10 ESSENTIAL HYPERTENSION: Primary | ICD-10-CM

## 2021-06-05 LAB
ALBUMIN SERPL-MCNC: 4.2 G/DL (ref 3.6–5.1)
ALBUMIN/GLOB SERPL: 1.4 (CALC) (ref 1–2.5)
ALP SERPL-CCNC: 89 U/L (ref 37–153)
ALT SERPL-CCNC: 14 U/L (ref 6–29)
APPEARANCE UR: CLEAR
AST SERPL-CCNC: 18 U/L (ref 10–35)
BASOPHILS # BLD AUTO: 38 CELLS/UL (ref 0–200)
BASOPHILS NFR BLD AUTO: 0.7 %
BILIRUB SERPL-MCNC: 0.5 MG/DL (ref 0.2–1.2)
BILIRUB UR QL STRIP: NEGATIVE
BUN SERPL-MCNC: 20 MG/DL (ref 7–25)
BUN/CREAT SERPL: NORMAL (CALC) (ref 6–22)
CALCIUM SERPL-MCNC: 10.3 MG/DL (ref 8.6–10.4)
CHLORIDE SERPL-SCNC: 104 MMOL/L (ref 98–110)
CHOLEST SERPL-MCNC: 178 MG/DL
CHOLEST/HDLC SERPL: 3.2 (CALC)
CO2 SERPL-SCNC: 30 MMOL/L (ref 20–32)
COLOR UR: YELLOW
CREAT SERPL-MCNC: 0.9 MG/DL (ref 0.5–0.99)
EOSINOPHIL # BLD AUTO: 410 CELLS/UL (ref 15–500)
EOSINOPHIL NFR BLD AUTO: 7.6 %
ERYTHROCYTE [DISTWIDTH] IN BLOOD BY AUTOMATED COUNT: 14.6 % (ref 11–15)
GLOBULIN SER CALC-MCNC: 3.1 G/DL (CALC) (ref 1.9–3.7)
GLUCOSE SERPL-MCNC: 86 MG/DL (ref 65–99)
GLUCOSE UR QL STRIP: NEGATIVE
HCT VFR BLD AUTO: 41.2 % (ref 35–45)
HDLC SERPL-MCNC: 56 MG/DL
HGB BLD-MCNC: 13 G/DL (ref 11.7–15.5)
HGB UR QL STRIP: NEGATIVE
KETONES UR QL STRIP: NEGATIVE
LDLC SERPL CALC-MCNC: 109 MG/DL (CALC)
LEUKOCYTE ESTERASE UR QL STRIP: NEGATIVE
LYMPHOCYTES # BLD AUTO: 1399 CELLS/UL (ref 850–3900)
LYMPHOCYTES NFR BLD AUTO: 25.9 %
MCH RBC QN AUTO: 25.6 PG (ref 27–33)
MCHC RBC AUTO-ENTMCNC: 31.6 G/DL (ref 32–36)
MCV RBC AUTO: 81.1 FL (ref 80–100)
MONOCYTES # BLD AUTO: 432 CELLS/UL (ref 200–950)
MONOCYTES NFR BLD AUTO: 8 %
NEUTROPHILS # BLD AUTO: 3121 CELLS/UL (ref 1500–7800)
NEUTROPHILS NFR BLD AUTO: 57.8 %
NITRITE UR QL STRIP: NEGATIVE
NONHDLC SERPL-MCNC: 122 MG/DL (CALC)
PH UR STRIP: 6 [PH] (ref 5–8)
PLATELET # BLD AUTO: 255 THOUSAND/UL (ref 140–400)
PMV BLD REES-ECKER: 12.3 FL (ref 7.5–12.5)
POTASSIUM SERPL-SCNC: 4.3 MMOL/L (ref 3.5–5.3)
PROT SERPL-MCNC: 7.3 G/DL (ref 6.1–8.1)
PROT UR QL STRIP: NEGATIVE
RBC # BLD AUTO: 5.08 MILLION/UL (ref 3.8–5.1)
SODIUM SERPL-SCNC: 140 MMOL/L (ref 135–146)
SP GR UR STRIP: 1.02 (ref 1–1.03)
TRIGL SERPL-MCNC: 45 MG/DL
TSH SERPL-ACNC: 2.16 MIU/L (ref 0.4–4.5)
WBC # BLD AUTO: 5.4 THOUSAND/UL (ref 3.8–10.8)

## 2021-06-07 ENCOUNTER — PATIENT MESSAGE (OUTPATIENT)
Dept: FAMILY MEDICINE | Facility: CLINIC | Age: 66
End: 2021-06-07

## 2021-06-17 ENCOUNTER — OFFICE VISIT (OUTPATIENT)
Dept: FAMILY MEDICINE | Facility: CLINIC | Age: 66
End: 2021-06-17
Payer: MEDICARE

## 2021-06-17 VITALS
HEART RATE: 82 BPM | WEIGHT: 161 LBS | TEMPERATURE: 98 F | OXYGEN SATURATION: 98 % | BODY MASS INDEX: 25.88 KG/M2 | DIASTOLIC BLOOD PRESSURE: 86 MMHG | SYSTOLIC BLOOD PRESSURE: 132 MMHG | HEIGHT: 66 IN

## 2021-06-17 DIAGNOSIS — I10 ESSENTIAL HYPERTENSION: Primary | ICD-10-CM

## 2021-06-17 PROCEDURE — 99213 PR OFFICE/OUTPT VISIT, EST, LEVL III, 20-29 MIN: ICD-10-PCS | Mod: S$GLB,,, | Performed by: NURSE PRACTITIONER

## 2021-06-17 PROCEDURE — 99213 OFFICE O/P EST LOW 20 MIN: CPT | Mod: S$GLB,,, | Performed by: NURSE PRACTITIONER

## 2021-06-17 RX ORDER — LOSARTAN POTASSIUM AND HYDROCHLOROTHIAZIDE 25; 100 MG/1; MG/1
1 TABLET ORAL DAILY
Qty: 90 TABLET | Refills: 1 | Status: SHIPPED | OUTPATIENT
Start: 2021-06-17 | End: 2021-12-23 | Stop reason: SDUPTHER

## 2021-09-28 ENCOUNTER — TELEPHONE (OUTPATIENT)
Dept: FAMILY MEDICINE | Facility: CLINIC | Age: 66
End: 2021-09-28

## 2021-09-29 ENCOUNTER — OFFICE VISIT (OUTPATIENT)
Dept: FAMILY MEDICINE | Facility: CLINIC | Age: 66
End: 2021-09-29
Payer: MEDICARE

## 2021-09-29 VITALS
OXYGEN SATURATION: 98 % | HEIGHT: 66 IN | BODY MASS INDEX: 26.84 KG/M2 | WEIGHT: 167 LBS | SYSTOLIC BLOOD PRESSURE: 130 MMHG | DIASTOLIC BLOOD PRESSURE: 90 MMHG | TEMPERATURE: 98 F | HEART RATE: 87 BPM

## 2021-09-29 DIAGNOSIS — I10 ESSENTIAL HYPERTENSION: ICD-10-CM

## 2021-09-29 DIAGNOSIS — B35.1 TINEA UNGUIUM: Primary | ICD-10-CM

## 2021-09-29 PROCEDURE — 99213 PR OFFICE/OUTPT VISIT, EST, LEVL III, 20-29 MIN: ICD-10-PCS | Mod: S$GLB,,, | Performed by: NURSE PRACTITIONER

## 2021-09-29 PROCEDURE — 99213 OFFICE O/P EST LOW 20 MIN: CPT | Mod: S$GLB,,, | Performed by: NURSE PRACTITIONER

## 2021-09-29 RX ORDER — TERBINAFINE HYDROCHLORIDE 250 MG/1
250 TABLET ORAL DAILY
Qty: 90 TABLET | Refills: 0 | Status: SHIPPED | OUTPATIENT
Start: 2021-09-29 | End: 2021-12-23 | Stop reason: SDUPTHER

## 2021-12-22 ENCOUNTER — PATIENT MESSAGE (OUTPATIENT)
Dept: FAMILY MEDICINE | Facility: CLINIC | Age: 66
End: 2021-12-22
Payer: MEDICARE

## 2021-12-22 LAB
ALBUMIN SERPL-MCNC: 4.4 G/DL (ref 3.6–5.1)
ALBUMIN/GLOB SERPL: 1.4 (CALC) (ref 1–2.5)
ALP SERPL-CCNC: 97 U/L (ref 37–153)
ALT SERPL-CCNC: 23 U/L (ref 6–29)
AST SERPL-CCNC: 22 U/L (ref 10–35)
BILIRUB SERPL-MCNC: 0.5 MG/DL (ref 0.2–1.2)
BUN SERPL-MCNC: 17 MG/DL (ref 7–25)
BUN/CREAT SERPL: ABNORMAL (CALC) (ref 6–22)
CALCIUM SERPL-MCNC: 11.3 MG/DL (ref 8.6–10.4)
CHLORIDE SERPL-SCNC: 103 MMOL/L (ref 98–110)
CO2 SERPL-SCNC: 29 MMOL/L (ref 20–32)
CREAT SERPL-MCNC: 0.94 MG/DL (ref 0.5–0.99)
GLOBULIN SER CALC-MCNC: 3.2 G/DL (CALC) (ref 1.9–3.7)
GLUCOSE SERPL-MCNC: 92 MG/DL (ref 65–99)
POTASSIUM SERPL-SCNC: 4.1 MMOL/L (ref 3.5–5.3)
PROT SERPL-MCNC: 7.6 G/DL (ref 6.1–8.1)
SODIUM SERPL-SCNC: 141 MMOL/L (ref 135–146)

## 2021-12-23 ENCOUNTER — OFFICE VISIT (OUTPATIENT)
Dept: FAMILY MEDICINE | Facility: CLINIC | Age: 66
End: 2021-12-23
Payer: MEDICARE

## 2021-12-23 VITALS
RESPIRATION RATE: 18 BRPM | WEIGHT: 163 LBS | OXYGEN SATURATION: 97 % | HEART RATE: 89 BPM | SYSTOLIC BLOOD PRESSURE: 126 MMHG | HEIGHT: 66 IN | TEMPERATURE: 98 F | DIASTOLIC BLOOD PRESSURE: 86 MMHG | BODY MASS INDEX: 26.2 KG/M2

## 2021-12-23 DIAGNOSIS — Z12.31 SCREENING MAMMOGRAM, ENCOUNTER FOR: ICD-10-CM

## 2021-12-23 DIAGNOSIS — B35.1 TINEA UNGUIUM: ICD-10-CM

## 2021-12-23 DIAGNOSIS — I10 ESSENTIAL HYPERTENSION: Primary | ICD-10-CM

## 2021-12-23 PROCEDURE — 99214 OFFICE O/P EST MOD 30 MIN: CPT | Mod: S$GLB,,, | Performed by: NURSE PRACTITIONER

## 2021-12-23 PROCEDURE — 99214 PR OFFICE/OUTPT VISIT, EST, LEVL IV, 30-39 MIN: ICD-10-PCS | Mod: S$GLB,,, | Performed by: NURSE PRACTITIONER

## 2021-12-23 RX ORDER — TERBINAFINE HYDROCHLORIDE 250 MG/1
250 TABLET ORAL DAILY
Qty: 30 TABLET | Refills: 0 | Status: SHIPPED | OUTPATIENT
Start: 2021-12-23 | End: 2022-06-23

## 2021-12-23 RX ORDER — LOSARTAN POTASSIUM AND HYDROCHLOROTHIAZIDE 25; 100 MG/1; MG/1
1 TABLET ORAL DAILY
Qty: 90 TABLET | Refills: 1 | Status: SHIPPED | OUTPATIENT
Start: 2021-12-23 | End: 2022-06-23 | Stop reason: SDUPTHER

## 2021-12-23 RX ORDER — MULTIVIT WITH MINERALS/HERBS
1 TABLET ORAL DAILY
COMMUNITY
End: 2022-06-23

## 2022-01-11 ENCOUNTER — OFFICE VISIT (OUTPATIENT)
Dept: PODIATRY | Facility: CLINIC | Age: 67
End: 2022-01-11
Payer: MEDICARE

## 2022-01-11 VITALS
OXYGEN SATURATION: 99 % | DIASTOLIC BLOOD PRESSURE: 82 MMHG | SYSTOLIC BLOOD PRESSURE: 124 MMHG | RESPIRATION RATE: 16 BRPM | WEIGHT: 163 LBS | HEIGHT: 66 IN | BODY MASS INDEX: 26.2 KG/M2 | HEART RATE: 88 BPM

## 2022-01-11 DIAGNOSIS — L60.2 OG (ONYCHOGRYPHOSIS): ICD-10-CM

## 2022-01-11 DIAGNOSIS — B35.1 TINEA UNGUIUM: ICD-10-CM

## 2022-01-11 DIAGNOSIS — B35.1 ONYCHOMYCOSIS DUE TO DERMATOPHYTE: Primary | ICD-10-CM

## 2022-01-11 PROCEDURE — 99203 PR OFFICE/OUTPT VISIT, NEW, LEVL III, 30-44 MIN: ICD-10-PCS | Mod: S$GLB,,, | Performed by: PODIATRIST

## 2022-01-11 PROCEDURE — 99203 OFFICE O/P NEW LOW 30 MIN: CPT | Mod: S$GLB,,, | Performed by: PODIATRIST

## 2022-01-11 NOTE — PATIENT INSTRUCTIONS
Nail Fungal Infection  A nail fungal infection changes the way fingernails and toenails look. They may thicken, discolor, change shape, or split. This condition is hard to treat because nails grow slowly and have limited blood supply. The infection often comes back after treatment.  There are 2 types of medicines used to treat this condition:  · Topical anti-fungal medicines. These are applied to the surface of the skin and nail area. These medicines are not very effective because they cant get deep into the nail.  · Oral antifungal medicines. These medicines work better because they go into the nail from the inside out. But the infection may still come back. It may take 9 to 12 months for your nail to look normal again. This means you are cured. You can repeat treatment if needed. Most people take these medicines without any problems. It is rare to stop therapy because of side effects. But your healthcare provider may give you some monitoring tests. Talk about possible side effects with your provider before starting treatment.  If medicines fail, the nail can be removed surgically or chemically. These methods physically remove the fungus from the body. This helps medical treatment be more effective.  Home care  · Use medicines exactly as directed for as long as directed. Treating a fungal infection can take longer than other kinds of infections.  · Smoking is a risk factor for fungal infection. This is one more reason to quit.  · Wear absorbent socks, and shoes that let your feet breathe. Sweaty feet increase your risk of fungal infection. They also make an existing infection harder to treat.  · Use footwear when in damp public places like swimming pools, gyms, and shower rooms. This will help you avoid the fungus that grows there.  · Don't share nail clippers or scissors with others.  Follow-up care  Follow up with your healthcare provider, or as advised.  When to seek medical advice  Call your healthcare  provider right away if any of these occur:  · Skin by the nail becomes red, swollen, painful, or drains pus (a creamy yellow or white liquid)  · Side effects from oral anti-fungal medicines  Date Last Reviewed: 8/1/2016  © 5819-0189 [employer]. [address] [place] [address]. All rights reserved. This information is not intended as a substitute for professional medical care. Always follow your healthcare professional's instructions.

## 2022-01-11 NOTE — PROGRESS NOTES
"  [address] [facility] [address] [place] [address]  Phone: [phone]   [phone]    Patient's PCP:Malcom Vigil NP  Referring Provider: Malcom Vigil    Subjective:      Chief Complaint:: Nail Problem (Bilateral fungal nail notes discoloration and thickening)    QUEENIE Espinoza is a 66 y.o. female who presents with a complaint of bilateral fungal nails lasting for sometime. Onset of symptoms discoloration and thickening and reports no trauma.  Current symptoms include discoloration, nail thickening and brreakage.  Aggravating factors are none. Symptoms have progressed over time. Treatment to date have included over the counter antifungal medication, and Lamisil currently on thirty day booster dosage..    Systemic Doctor: Malcom Vigil NP  Date Last Seen: 12/23/2021    Vitals:    01/11/22 1014   BP: 124/82   Pulse: 88   Resp: 16   SpO2: 99%   Weight: 73.9 kg (163 lb)   Height: 5' 6" (1.676 m)   PainSc: 0-No pain      Shoe Size: 8    Past Surgical History:   Procedure Laterality Date    AUGMENTATION OF BREAST      BREAST BIOPSY      BREAST CYST EXCISION      BREAST SURGERY      MYOMECTOMY      RIGHT OOPHORECTOMY       Past Medical History:   Diagnosis Date    Hypertension      Family History   Problem Relation Age of Onset    Stroke Mother         Social History:   Marital Status: Single  Alcohol History:  reports current alcohol use.  Tobacco History:  reports that she has never smoked. She has never used smokeless tobacco.  Drug History:  reports no history of drug use.    Review of patient's allergies indicates:  No Known Allergies    Current Outpatient Medications   Medication Sig Dispense Refill    aspirin 81 MG Chew Take 81 mg by mouth once daily.      b complex vitamins tablet Take 1 tablet by mouth once daily.      fish oil-omega-3 fatty acids 300-1,000 mg capsule Take 2 g by mouth once daily.      losartan-hydrochlorothiazide 100-25 mg (HYZAAR) 100-25 mg per tablet Take 1 tablet by " mouth once daily. 90 tablet 1    multivitamin (THERAGRAN) per tablet Take 1 tablet by mouth once daily.      terbinafine HCL (LAMISIL) 250 mg tablet Take 1 tablet (250 mg total) by mouth once daily. 30 tablet 0     No current facility-administered medications for this visit.       Review of Systems   Constitutional: Negative for chills, fatigue, fever and unexpected weight change.   HENT: Negative for hearing loss and trouble swallowing.    Eyes: Negative for photophobia and visual disturbance.   Respiratory: Negative for cough, shortness of breath and wheezing.    Cardiovascular: Negative for chest pain, palpitations and leg swelling.   Gastrointestinal: Negative for abdominal pain and nausea.   Genitourinary: Negative for dysuria and frequency.   Musculoskeletal: Positive for back pain. Negative for arthralgias, gait problem, joint swelling and myalgias.   Skin: Positive for color change. Negative for rash and wound.   Neurological: Negative for tremors, seizures, weakness, numbness and headaches.   Hematological: Does not bruise/bleed easily.         Objective:        Physical Exam:   Foot Exam    General  General Appearance: appears stated age and healthy   Orientation: alert and oriented to person, place, and time   Affect: appropriate   Gait: unimpaired       Right Foot/Ankle     Inspection and Palpation  Ecchymosis: none  Tenderness: great toe metatarsophalangeal joint   Swelling: great toe metatarsophalangeal joint   Arch: pes planus  Hammertoes: second toe, third toe and fourth toe  Claw Toes: absent  Hallux valgus: yes  Hallux limitus: no  Skin Exam: skin intact;   Fungus Toenails: present    Neurovascular  Dorsalis pedis: 2+  Posterior tibial: 2+  Capillary Refill: 2+  Saphenous nerve sensation: normal  Tibial nerve sensation: normal  Superficial peroneal nerve sensation: normal  Deep peroneal nerve sensation: normal  Sural nerve sensation: normal      Left Foot/Ankle      Inspection and  Palpation  Ecchymosis: none  Tenderness: great toe metatarsophalangeal joint   Swelling: great toe metatarsophalangeal joint   Arch: pes planus  Hammertoes: second toe, third toe and fourth toe  Claw toes: absent  Hallux valgus: yes  Hallux limitus: no  Skin Exam: skin intact;   Fungus Toenails: present    Neurovascular  Dorsalis pedis: 2+  Posterior tibial: 2+  Capillary refill: 2+  Saphenous nerve sensation: normal  Tibial nerve sensation: normal  Superficial peroneal nerve sensation: normal  Deep peroneal nerve sensation: normal  Sural nerve sensation: normal          Physical Exam  Cardiovascular:      Pulses:           Dorsalis pedis pulses are 2+ on the right side and 2+ on the left side.        Posterior tibial pulses are 2+ on the right side and 2+ on the left side.   Musculoskeletal:      Right foot: Bunion present.      Left foot: Bunion present.   Feet:      Right foot:      Toenail Condition: Fungal disease present.     Left foot:      Toenail Condition: Fungal disease present.        Imaging:            Assessment:       1. Onychomycosis due to dermatophyte    2. OG (onychogryphosis)    3. Tinea unguium      Plan:   Onychomycosis due to dermatophyte    OG (onychogryphosis)    Tinea unguium  -     Ambulatory referral/consult to Podiatry    Fungal infection of toenails explained. Treatment options including no treatment, periodic debridement, topical medications, oral medications, and removal of the nail were discussed, as well as success rates and risks of recurrence. We agreed on oral medication, will order the necessary lab work the patient has already been prescribed Lamisil which she has taken the 1st 90 days as stop for 90 days and now taking the last 30 days of pills.  Site explained to her is the best choice she has right now through oral medication try to eradicate the fungus.  Also explained she needs to realize there is nothing that works 100%.  If this treatment fails she can consider doing  Diflucan 1 pill weekly for 28 weeks but using not as successful as the Lamisil.  If she wants to try another medication she return in approximately 3 months to see me.      Procedures          Counseling:     I provided patient education verbally regarding:   Patient diagnosis, treatment options, as well as alternatives, risks, and benefits.     This note was created using Dragon voice recognition software that occasionally misinterpreted phrases or words.

## 2022-01-14 ENCOUNTER — HOSPITAL ENCOUNTER (OUTPATIENT)
Dept: RADIOLOGY | Facility: HOSPITAL | Age: 67
Discharge: HOME | End: 2022-01-14
Attending: NURSE PRACTITIONER
Payer: MEDICARE

## 2022-01-14 DIAGNOSIS — Z12.31 SCREENING MAMMOGRAM, ENCOUNTER FOR: ICD-10-CM

## 2022-01-14 PROCEDURE — 77067 SCR MAMMO BI INCL CAD: CPT | Mod: TC,PO

## 2022-01-14 PROCEDURE — 77063 BREAST TOMOSYNTHESIS BI: CPT | Mod: TC,PO

## 2022-06-13 ENCOUNTER — TELEPHONE (OUTPATIENT)
Dept: FAMILY MEDICINE | Facility: CLINIC | Age: 67
End: 2022-06-13

## 2022-06-13 DIAGNOSIS — I10 ESSENTIAL HYPERTENSION: ICD-10-CM

## 2022-06-13 DIAGNOSIS — Z00.00 PREVENTATIVE HEALTH CARE: Primary | ICD-10-CM

## 2022-06-15 ENCOUNTER — TELEPHONE (OUTPATIENT)
Dept: FAMILY MEDICINE | Facility: CLINIC | Age: 67
End: 2022-06-15

## 2022-06-15 DIAGNOSIS — R79.89 ABNORMAL TSH: Primary | ICD-10-CM

## 2022-06-15 LAB
ALBUMIN SERPL-MCNC: 4.2 G/DL (ref 3.6–5.1)
ALBUMIN/GLOB SERPL: 1.4 (CALC) (ref 1–2.5)
ALP SERPL-CCNC: 90 U/L (ref 37–153)
ALT SERPL-CCNC: 14 U/L (ref 6–29)
APPEARANCE UR: CLEAR
AST SERPL-CCNC: 18 U/L (ref 10–35)
BASOPHILS # BLD AUTO: 38 CELLS/UL (ref 0–200)
BASOPHILS NFR BLD AUTO: 0.7 %
BILIRUB SERPL-MCNC: 0.6 MG/DL (ref 0.2–1.2)
BILIRUB UR QL STRIP: NEGATIVE
BUN SERPL-MCNC: 17 MG/DL (ref 7–25)
BUN/CREAT SERPL: ABNORMAL (CALC) (ref 6–22)
CALCIUM SERPL-MCNC: 11 MG/DL (ref 8.6–10.4)
CHLORIDE SERPL-SCNC: 106 MMOL/L (ref 98–110)
CHOLEST SERPL-MCNC: 178 MG/DL
CHOLEST/HDLC SERPL: 3.2 (CALC)
CO2 SERPL-SCNC: 30 MMOL/L (ref 20–32)
COLOR UR: YELLOW
CREAT SERPL-MCNC: 0.8 MG/DL (ref 0.5–0.99)
EOSINOPHIL # BLD AUTO: 475 CELLS/UL (ref 15–500)
EOSINOPHIL NFR BLD AUTO: 8.8 %
ERYTHROCYTE [DISTWIDTH] IN BLOOD BY AUTOMATED COUNT: 14.9 % (ref 11–15)
GLOBULIN SER CALC-MCNC: 3.1 G/DL (CALC) (ref 1.9–3.7)
GLUCOSE SERPL-MCNC: 89 MG/DL (ref 65–99)
GLUCOSE UR QL STRIP: NEGATIVE
HCT VFR BLD AUTO: 41.8 % (ref 35–45)
HDLC SERPL-MCNC: 56 MG/DL
HGB BLD-MCNC: 13.1 G/DL (ref 11.7–15.5)
HGB UR QL STRIP: NEGATIVE
KETONES UR QL STRIP: NEGATIVE
LDLC SERPL CALC-MCNC: 109 MG/DL (CALC)
LEUKOCYTE ESTERASE UR QL STRIP: ABNORMAL
LYMPHOCYTES # BLD AUTO: 1831 CELLS/UL (ref 850–3900)
LYMPHOCYTES NFR BLD AUTO: 33.9 %
MCH RBC QN AUTO: 26 PG (ref 27–33)
MCHC RBC AUTO-ENTMCNC: 31.3 G/DL (ref 32–36)
MCV RBC AUTO: 82.9 FL (ref 80–100)
MONOCYTES # BLD AUTO: 556 CELLS/UL (ref 200–950)
MONOCYTES NFR BLD AUTO: 10.3 %
NEUTROPHILS # BLD AUTO: 2500 CELLS/UL (ref 1500–7800)
NEUTROPHILS NFR BLD AUTO: 46.3 %
NITRITE UR QL STRIP: NEGATIVE
NONHDLC SERPL-MCNC: 122 MG/DL (CALC)
PH UR STRIP: 5.5 [PH] (ref 5–8)
PLATELET # BLD AUTO: 263 THOUSAND/UL (ref 140–400)
PMV BLD REES-ECKER: 11.3 FL (ref 7.5–12.5)
POTASSIUM SERPL-SCNC: 4.1 MMOL/L (ref 3.5–5.3)
PROT SERPL-MCNC: 7.3 G/DL (ref 6.1–8.1)
PROT UR QL STRIP: NEGATIVE
RBC # BLD AUTO: 5.04 MILLION/UL (ref 3.8–5.1)
SODIUM SERPL-SCNC: 141 MMOL/L (ref 135–146)
SP GR UR STRIP: 1.02 (ref 1–1.03)
TRIGL SERPL-MCNC: 50 MG/DL
TSH SERPL-ACNC: 4.87 MIU/L (ref 0.4–4.5)
WBC # BLD AUTO: 5.4 THOUSAND/UL (ref 3.8–10.8)

## 2022-06-23 ENCOUNTER — OFFICE VISIT (OUTPATIENT)
Dept: FAMILY MEDICINE | Facility: CLINIC | Age: 67
End: 2022-06-23
Payer: MEDICARE

## 2022-06-23 VITALS
HEIGHT: 66 IN | HEART RATE: 91 BPM | TEMPERATURE: 98 F | SYSTOLIC BLOOD PRESSURE: 122 MMHG | BODY MASS INDEX: 26.03 KG/M2 | DIASTOLIC BLOOD PRESSURE: 82 MMHG | WEIGHT: 162 LBS | OXYGEN SATURATION: 99 %

## 2022-06-23 DIAGNOSIS — R79.89 ELEVATED TSH: ICD-10-CM

## 2022-06-23 DIAGNOSIS — Z12.11 SCREEN FOR COLON CANCER: ICD-10-CM

## 2022-06-23 DIAGNOSIS — Z78.0 POST-MENOPAUSAL: ICD-10-CM

## 2022-06-23 DIAGNOSIS — L20.9 ATOPIC DERMATITIS, UNSPECIFIED TYPE: ICD-10-CM

## 2022-06-23 DIAGNOSIS — I10 ESSENTIAL HYPERTENSION: Primary | ICD-10-CM

## 2022-06-23 PROCEDURE — 99214 PR OFFICE/OUTPT VISIT, EST, LEVL IV, 30-39 MIN: ICD-10-PCS | Mod: S$GLB,,, | Performed by: NURSE PRACTITIONER

## 2022-06-23 PROCEDURE — 99214 OFFICE O/P EST MOD 30 MIN: CPT | Mod: S$GLB,,, | Performed by: NURSE PRACTITIONER

## 2022-06-23 RX ORDER — LOSARTAN POTASSIUM AND HYDROCHLOROTHIAZIDE 25; 100 MG/1; MG/1
1 TABLET ORAL DAILY
Qty: 90 TABLET | Refills: 1 | Status: SHIPPED | OUTPATIENT
Start: 2022-06-23 | End: 2022-12-28 | Stop reason: SDUPTHER

## 2022-06-23 RX ORDER — BETAMETHASONE DIPROPIONATE 0.5 MG/G
CREAM TOPICAL 2 TIMES DAILY
Qty: 45 G | Refills: 0 | Status: SHIPPED | OUTPATIENT
Start: 2022-06-23 | End: 2022-12-28

## 2022-06-23 NOTE — PROGRESS NOTES
SUBJECTIVE:      Patient ID: Delgado Espinoza is a 66 y.o. female.    Chief Complaint: Hypertension    Patient is here today to f/u on htn. She is doing well on her current meds. She is having an atopic dermatitis flare and asking for a refill on steroid cream.    Hypertension  This is a chronic problem. The current episode started more than 1 year ago. The problem is unchanged. The problem is controlled. Pertinent negatives include no anxiety, chest pain, headaches, malaise/fatigue, orthopnea, palpitations, peripheral edema or shortness of breath. Past treatments include angiotensin blockers and diuretics. The current treatment provides moderate improvement.       Past Surgical History:   Procedure Laterality Date    AUGMENTATION OF BREAST      BREAST BIOPSY      BREAST CYST EXCISION      BREAST SURGERY      MYOMECTOMY      RIGHT OOPHORECTOMY       Family History   Problem Relation Age of Onset    Stroke Mother       Social History     Socioeconomic History    Marital status: Single   Tobacco Use    Smoking status: Never Smoker    Smokeless tobacco: Never Used   Substance and Sexual Activity    Alcohol use: Yes     Comment: socially    Drug use: No     Current Outpatient Medications   Medication Sig Dispense Refill    aspirin 81 MG Chew Take 81 mg by mouth once daily.      fish oil-omega-3 fatty acids 300-1,000 mg capsule Take 2 g by mouth once daily.      multivitamin (THERAGRAN) per tablet Take 1 tablet by mouth once daily.      betamethasone dipropionate 0.05 % cream Apply topically 2 (two) times daily. for 7 days 45 g 0    losartan-hydrochlorothiazide 100-25 mg (HYZAAR) 100-25 mg per tablet Take 1 tablet by mouth once daily. 90 tablet 1     No current facility-administered medications for this visit.     Review of patient's allergies indicates:  No Known Allergies   Past Medical History:   Diagnosis Date    Hypertension      Past Surgical History:   Procedure Laterality Date     "AUGMENTATION OF BREAST      BREAST BIOPSY      BREAST CYST EXCISION      BREAST SURGERY      MYOMECTOMY      RIGHT OOPHORECTOMY         Review of Systems   Constitutional: Negative for appetite change, fatigue, fever, malaise/fatigue and unexpected weight change.   HENT: Negative for ear discharge, hearing loss, trouble swallowing and voice change.    Eyes: Negative for photophobia and pain.   Respiratory: Negative for chest tightness, shortness of breath and stridor.    Cardiovascular: Negative for chest pain, palpitations, orthopnea and leg swelling.   Gastrointestinal: Negative for abdominal pain, blood in stool and constipation.   Endocrine: Negative for cold intolerance and heat intolerance.   Genitourinary: Negative for difficulty urinating and flank pain.   Musculoskeletal: Negative for neck stiffness.   Skin: Negative for pallor.   Neurological: Negative for dizziness, speech difficulty, weakness, light-headedness and headaches.   Hematological: Does not bruise/bleed easily.   Psychiatric/Behavioral: Negative for confusion, dysphoric mood, self-injury, sleep disturbance and suicidal ideas. The patient is not nervous/anxious.       OBJECTIVE:      Vitals:    06/23/22 1320 06/23/22 1341   BP: (!) 140/80 122/82   Pulse: 91    Temp: 98.2 °F (36.8 °C)    SpO2: 99%    Weight: 73.5 kg (162 lb)    Height: 5' 6" (1.676 m)      Physical Exam  Vitals and nursing note reviewed.   Constitutional:       General: She is not in acute distress.     Appearance: She is well-developed.   HENT:      Head: Normocephalic and atraumatic.      Right Ear: Tympanic membrane normal.      Left Ear: Tympanic membrane normal.      Nose: Nose normal.      Mouth/Throat:      Pharynx: Uvula midline.   Eyes:      General: Lids are normal.      Conjunctiva/sclera: Conjunctivae normal.      Pupils: Pupils are equal, round, and reactive to light.      Right eye: Pupil is round and reactive.      Left eye: Pupil is round and reactive. "   Neck:      Thyroid: No thyromegaly.      Vascular: No JVD.      Trachea: Trachea normal.   Cardiovascular:      Rate and Rhythm: Normal rate and regular rhythm.      Pulses: Normal pulses.      Heart sounds: Normal heart sounds. No murmur heard.  Pulmonary:      Effort: Pulmonary effort is normal. No tachypnea or respiratory distress.      Breath sounds: Normal breath sounds. No wheezing, rhonchi or rales.   Abdominal:      General: Bowel sounds are normal.      Palpations: Abdomen is soft.      Tenderness: There is no abdominal tenderness.   Musculoskeletal:         General: Normal range of motion.      Cervical back: Normal range of motion and neck supple.      Right lower leg: No edema.      Left lower leg: No edema.   Lymphadenopathy:      Cervical: No cervical adenopathy.   Skin:     General: Skin is warm and dry.      Findings: No rash.      Comments: Right hand and lower chin with scaled pruritic rash   Neurological:      Mental Status: She is alert and oriented to person, place, and time.   Psychiatric:         Mood and Affect: Mood normal.         Speech: Speech normal.         Behavior: Behavior normal. Behavior is cooperative.         Thought Content: Thought content normal.         Judgment: Judgment normal.         Telephone on 06/13/2022   Component Date Value Ref Range Status    WBC 06/14/2022 5.4  3.8 - 10.8 Thousand/uL Final    RBC 06/14/2022 5.04  3.80 - 5.10 Million/uL Final    Hemoglobin 06/14/2022 13.1  11.7 - 15.5 g/dL Final    Hematocrit 06/14/2022 41.8  35.0 - 45.0 % Final    MCV 06/14/2022 82.9  80.0 - 100.0 fL Final    MCH 06/14/2022 26.0 (A) 27.0 - 33.0 pg Final    MCHC 06/14/2022 31.3 (A) 32.0 - 36.0 g/dL Final    RDW 06/14/2022 14.9  11.0 - 15.0 % Final    Platelets 06/14/2022 263  140 - 400 Thousand/uL Final    MPV 06/14/2022 11.3  7.5 - 12.5 fL Final    Neutrophils, Abs 06/14/2022 2,500  1,500 - 7,800 cells/uL Final    Lymph # 06/14/2022 1,831  850 - 3,900 cells/uL Final     Mono # 06/14/2022 556  200 - 950 cells/uL Final    Eos # 06/14/2022 475  15 - 500 cells/uL Final    Baso # 06/14/2022 38  0 - 200 cells/uL Final    Neutrophils Relative 06/14/2022 46.3  % Final    Lymph % 06/14/2022 33.9  % Final    Mono % 06/14/2022 10.3  % Final    Eosinophil % 06/14/2022 8.8  % Final    Basophil % 06/14/2022 0.7  % Final    Glucose 06/14/2022 89  65 - 99 mg/dL Final    Comment:               Fasting reference interval         BUN 06/14/2022 17  7 - 25 mg/dL Final    Creatinine 06/14/2022 0.80  0.50 - 0.99 mg/dL Final    Comment: For patients >49 years of age, the reference limit  for Creatinine is approximately 13% higher for people  identified as -American.         eGFR if non  06/14/2022 76  > OR = 60 mL/min/1.73m2 Final    eGFR if  06/14/2022 88  > OR = 60 mL/min/1.73m2 Final    BUN/Creatinine Ratio 06/14/2022 NOT APPLICABLE  6 - 22 (calc) Final    Sodium 06/14/2022 141  135 - 146 mmol/L Final    Potassium 06/14/2022 4.1  3.5 - 5.3 mmol/L Final    Chloride 06/14/2022 106  98 - 110 mmol/L Final    CO2 06/14/2022 30  20 - 32 mmol/L Final    Calcium 06/14/2022 11.0 (A) 8.6 - 10.4 mg/dL Final    Total Protein 06/14/2022 7.3  6.1 - 8.1 g/dL Final    Albumin 06/14/2022 4.2  3.6 - 5.1 g/dL Final    Globulin, Total 06/14/2022 3.1  1.9 - 3.7 g/dL (calc) Final    Albumin/Globulin Ratio 06/14/2022 1.4  1.0 - 2.5 (calc) Final    Total Bilirubin 06/14/2022 0.6  0.2 - 1.2 mg/dL Final    Alkaline Phosphatase 06/14/2022 90  37 - 153 U/L Final    AST 06/14/2022 18  10 - 35 U/L Final    ALT 06/14/2022 14  6 - 29 U/L Final    TSH 06/14/2022 4.87 (A) 0.40 - 4.50 mIU/L Final    Color, UA 06/14/2022 YELLOW  YELLOW Final    Appearance, UA 06/14/2022 CLEAR  CLEAR Final    Specific Gravity, UA 06/14/2022 1.016  1.001 - 1.035 Final    pH, UA 06/14/2022 5.5  5.0 - 8.0 Final    Glucose, UA 06/14/2022 NEGATIVE  NEGATIVE Final    Bilirubin, UA  06/14/2022 NEGATIVE  NEGATIVE Final    Ketones, UA 06/14/2022 NEGATIVE  NEGATIVE Final    Occult Blood UA 06/14/2022 NEGATIVE  NEGATIVE Final    Protein, UA 06/14/2022 NEGATIVE  NEGATIVE Final    Nitrite, UA 06/14/2022 NEGATIVE  NEGATIVE Final    Leukocytes, UA 06/14/2022 1+ (A) NEGATIVE Final    Cholesterol 06/14/2022 178  <200 mg/dL Final    HDL 06/14/2022 56  > OR = 50 mg/dL Final    Triglycerides 06/14/2022 50  <150 mg/dL Final    LDL Cholesterol 06/14/2022 109 (A) mg/dL (calc) Final    Comment: Reference range: <100     Desirable range <100 mg/dL for primary prevention;    <70 mg/dL for patients with CHD or diabetic patients   with > or = 2 CHD risk factors.     LDL-C is now calculated using the Luis Armando-Lyudmila   calculation, which is a validated novel method providing   better accuracy than the Friedewald equation in the   estimation of LDL-C.   Luis Armando SS et al. GUI. 2013;310(19): 7265-5135   (http://education.[employer].Bluechilli/faq/GRQ844)      HDL/Cholesterol Ratio 06/14/2022 3.2  <5.0 (calc) Final    Non HDL Chol. (LDL+VLDL) 06/14/2022 122  <130 mg/dL (calc) Final    Comment: For patients with diabetes plus 1 major ASCVD risk   factor, treating to a non-HDL-C goal of <100 mg/dL   (LDL-C of <70 mg/dL) is considered a therapeutic   option.     ]  Assessment:       1. Essential hypertension    2. Atopic dermatitis, unspecified type    3. Screen for colon cancer    4. Post-menopausal    5. Elevated TSH        Plan:       Essential hypertension  -     losartan-hydrochlorothiazide 100-25 mg (HYZAAR) 100-25 mg per tablet; Take 1 tablet by mouth once daily.  Dispense: 90 tablet; Refill: 1    Atopic dermatitis, unspecified type  - start   betamethasone dipropionate 0.05 % cream; Apply topically 2 (two) times daily. for 7 days  Dispense: 45 g; Refill: 0    Screen for colon cancer  -     Ambulatory referral/consult to Gastroenterology; Future; Expected date: 06/30/2022    Post-menopausal  -     Ambulatory  referral/consult to Obstetrics / Gynecology; Future; Expected date: 06/30/2022    Elevated TSH  Patient denies symptoms. Will monitor for now and recheck labs prior to next ov      Follow up in about 6 months (around 12/23/2022) for htn.      6/23/2022 Malcom Vigil, KENNEY, FNP

## 2022-08-11 ENCOUNTER — OFFICE VISIT (OUTPATIENT)
Dept: OPTOMETRY | Facility: CLINIC | Age: 67
End: 2022-08-11
Payer: MEDICARE

## 2022-08-11 DIAGNOSIS — H52.7 REFRACTIVE ERROR: ICD-10-CM

## 2022-08-11 DIAGNOSIS — H25.13 NUCLEAR SCLEROSIS, BILATERAL: Primary | ICD-10-CM

## 2022-08-11 DIAGNOSIS — H04.123 DRY EYE SYNDROME, BILATERAL: ICD-10-CM

## 2022-08-11 DIAGNOSIS — H26.9 CORTICAL CATARACT: ICD-10-CM

## 2022-08-11 PROCEDURE — 99999 PR PBB SHADOW E&M-EST. PATIENT-LVL II: CPT | Mod: PBBFAC,,, | Performed by: OPTOMETRIST

## 2022-08-11 PROCEDURE — 3288F PR FALLS RISK ASSESSMENT DOCUMENTED: ICD-10-PCS | Mod: CPTII,S$GLB,, | Performed by: OPTOMETRIST

## 2022-08-11 PROCEDURE — 1159F PR MEDICATION LIST DOCUMENTED IN MEDICAL RECORD: ICD-10-PCS | Mod: CPTII,S$GLB,, | Performed by: OPTOMETRIST

## 2022-08-11 PROCEDURE — 1101F PR PT FALLS ASSESS DOC 0-1 FALLS W/OUT INJ PAST YR: ICD-10-PCS | Mod: CPTII,S$GLB,, | Performed by: OPTOMETRIST

## 2022-08-11 PROCEDURE — 1160F PR REVIEW ALL MEDS BY PRESCRIBER/CLIN PHARMACIST DOCUMENTED: ICD-10-PCS | Mod: CPTII,S$GLB,, | Performed by: OPTOMETRIST

## 2022-08-11 PROCEDURE — 92004 PR EYE EXAM, NEW PATIENT,COMPREHESV: ICD-10-PCS | Mod: S$GLB,,, | Performed by: OPTOMETRIST

## 2022-08-11 PROCEDURE — 99999 PR PBB SHADOW E&M-EST. PATIENT-LVL II: ICD-10-PCS | Mod: PBBFAC,,, | Performed by: OPTOMETRIST

## 2022-08-11 PROCEDURE — 1126F PR PAIN SEVERITY QUANTIFIED, NO PAIN PRESENT: ICD-10-PCS | Mod: CPTII,S$GLB,, | Performed by: OPTOMETRIST

## 2022-08-11 PROCEDURE — 1101F PT FALLS ASSESS-DOCD LE1/YR: CPT | Mod: CPTII,S$GLB,, | Performed by: OPTOMETRIST

## 2022-08-11 PROCEDURE — 1160F RVW MEDS BY RX/DR IN RCRD: CPT | Mod: CPTII,S$GLB,, | Performed by: OPTOMETRIST

## 2022-08-11 PROCEDURE — 92004 COMPRE OPH EXAM NEW PT 1/>: CPT | Mod: S$GLB,,, | Performed by: OPTOMETRIST

## 2022-08-11 PROCEDURE — 1126F AMNT PAIN NOTED NONE PRSNT: CPT | Mod: CPTII,S$GLB,, | Performed by: OPTOMETRIST

## 2022-08-11 PROCEDURE — 1159F MED LIST DOCD IN RCRD: CPT | Mod: CPTII,S$GLB,, | Performed by: OPTOMETRIST

## 2022-08-11 PROCEDURE — 3288F FALL RISK ASSESSMENT DOCD: CPT | Mod: CPTII,S$GLB,, | Performed by: OPTOMETRIST

## 2022-08-11 NOTE — PROGRESS NOTES
HPI     Annual Exam      Additional comments: LDE: a few years ago               Comments     67 YO female presents today for an annual eye exam. Patient states that   she is doing well, notes no problems or complaints. Patient is not   interested in glasses at today's visit.     H/O lasik 15 years ago     Refresh OU PRN           Last edited by Mayda Valentine on 8/11/2022  2:38 PM. (History)            Assessment /Plan     For exam results, see Encounter Report.    Nuclear sclerosis, bilateral    Cortical cataract    Refractive error    Dry eye syndrome, bilateral      1. Nuclear sclerosis, bilateral  2. Cortical cataract  Moderate, approaching visual significance, pt asymptomatic at this time. Discussed possible ocular affects of cataracts. Acceptable BCVA OU. Discussed treatment options. Surgery not recommended at this time. Monitor yearly.     3. Refractive error  Declines refraction, happy with uncorrected vision    4. Dry eye syndrome, bilateral  Discussed ocular affects of dry eyes. Recommend OTC artificial tears 2-4 times a day in both eyes. Discussed chronicity of ELADIA. RTC if symptoms not alleviated by continued use of artificial tears.         RTC in 1 year for comprehensive eye exam, or sooner prn.

## 2022-10-21 ENCOUNTER — TELEPHONE (OUTPATIENT)
Dept: GASTROENTEROLOGY | Facility: CLINIC | Age: 67
End: 2022-10-21
Payer: MEDICARE

## 2022-10-21 DIAGNOSIS — Z12.11 SCREENING FOR COLON CANCER: Primary | ICD-10-CM

## 2022-10-21 NOTE — TELEPHONE ENCOUNTER
Call placed to Ms. Espinoza in regards to message received. She stated that she needed to schedule a colonoscopy. Offered her next available of 12/15 at 0830 arriving for 0730 with dr. Loo. She accepted. No further issues noted.     Prep instructions reviewed and sent to pt portal.

## 2022-10-21 NOTE — TELEPHONE ENCOUNTER
----- Message from Jacobo Glover sent at 10/21/2022 10:30 AM CDT -----  Regarding: pt called  Name of Who is Calling: CLIFF GARCIA [[id number]]      What is the request in detail: pt called requesting a call back for appt.Please advise      Can the clinic reply by [facility]: No      What Number to Call Back if not in [facility]:[phone] (home)

## 2022-11-10 ENCOUNTER — TELEPHONE (OUTPATIENT)
Dept: GASTROENTEROLOGY | Facility: CLINIC | Age: 67
End: 2022-11-10
Payer: MEDICARE

## 2022-11-10 NOTE — TELEPHONE ENCOUNTER
----- Message from Tiffanie Arnold sent at 11/10/2022  8:29 AM CST -----  Contact: self  Type: Needs Medical Advice    Who Called:  patient  Symptoms (please be specific):  cancel c-scope 12/15    Best Call Back Number: [phone]   Additional Information: The pt stated that she would like to cancel her c-scope on 12/15. The pt stated that her brother is having surg on 12/14 and is going to be with him.  Please call pt back and verify that it was cancel. Thanks.

## 2022-11-10 NOTE — TELEPHONE ENCOUNTER
Call placed to MsTonya Alexis in regards to message received. She stated that she needed to reschedule her colonoscopy due to her brother having surgery the day before. I advised her that Dr. Loo would be out on maternity leave come Jan 2023 so I would have to reschedule her procedure with Dr. Sudhir Garcia. She stated that would be fine. Offered her 1/11 at 0900 arriving for 0800. She accepted. New prep instructions sent tp pt portal.

## 2022-12-19 ENCOUNTER — TELEPHONE (OUTPATIENT)
Dept: FAMILY MEDICINE | Facility: CLINIC | Age: 67
End: 2022-12-19

## 2022-12-19 DIAGNOSIS — I10 ESSENTIAL HYPERTENSION: Primary | ICD-10-CM

## 2022-12-19 DIAGNOSIS — R79.89 ABNORMAL TSH: ICD-10-CM

## 2022-12-21 ENCOUNTER — PATIENT MESSAGE (OUTPATIENT)
Dept: FAMILY MEDICINE | Facility: CLINIC | Age: 67
End: 2022-12-21

## 2022-12-21 LAB
ALBUMIN SERPL-MCNC: 4.4 G/DL (ref 3.6–5.1)
ALBUMIN/GLOB SERPL: 1.3 (CALC) (ref 1–2.5)
ALP SERPL-CCNC: 102 U/L (ref 37–153)
ALT SERPL-CCNC: 14 U/L (ref 6–29)
AST SERPL-CCNC: 16 U/L (ref 10–35)
BILIRUB SERPL-MCNC: 0.4 MG/DL (ref 0.2–1.2)
BUN SERPL-MCNC: 15 MG/DL (ref 7–25)
BUN/CREAT SERPL: ABNORMAL (CALC) (ref 6–22)
CALCIUM SERPL-MCNC: 11.6 MG/DL (ref 8.6–10.4)
CHLORIDE SERPL-SCNC: 106 MMOL/L (ref 98–110)
CO2 SERPL-SCNC: 31 MMOL/L (ref 20–32)
CREAT SERPL-MCNC: 0.86 MG/DL (ref 0.5–1.05)
EGFR: 74 ML/MIN/1.73M2
GLOBULIN SER CALC-MCNC: 3.3 G/DL (CALC) (ref 1.9–3.7)
GLUCOSE SERPL-MCNC: 91 MG/DL (ref 65–99)
POTASSIUM SERPL-SCNC: 4 MMOL/L (ref 3.5–5.3)
PROT SERPL-MCNC: 7.7 G/DL (ref 6.1–8.1)
SODIUM SERPL-SCNC: 142 MMOL/L (ref 135–146)
T4 FREE SERPL-MCNC: 1.2 NG/DL (ref 0.8–1.8)
TSH SERPL-ACNC: 3.22 MIU/L (ref 0.4–4.5)

## 2022-12-28 ENCOUNTER — OFFICE VISIT (OUTPATIENT)
Dept: FAMILY MEDICINE | Facility: CLINIC | Age: 67
End: 2022-12-28
Payer: MEDICARE

## 2022-12-28 VITALS
OXYGEN SATURATION: 98 % | DIASTOLIC BLOOD PRESSURE: 86 MMHG | BODY MASS INDEX: 25.88 KG/M2 | HEART RATE: 83 BPM | HEIGHT: 66 IN | TEMPERATURE: 99 F | SYSTOLIC BLOOD PRESSURE: 126 MMHG | WEIGHT: 161 LBS

## 2022-12-28 DIAGNOSIS — Z12.31 SCREENING MAMMOGRAM, ENCOUNTER FOR: ICD-10-CM

## 2022-12-28 DIAGNOSIS — Z78.0 POST-MENOPAUSAL: ICD-10-CM

## 2022-12-28 DIAGNOSIS — I10 ESSENTIAL HYPERTENSION: Primary | ICD-10-CM

## 2022-12-28 DIAGNOSIS — Z13.820 SCREENING FOR OSTEOPOROSIS: ICD-10-CM

## 2022-12-28 PROCEDURE — 1159F PR MEDICATION LIST DOCUMENTED IN MEDICAL RECORD: ICD-10-PCS | Mod: CPTII,S$GLB,, | Performed by: NURSE PRACTITIONER

## 2022-12-28 PROCEDURE — 3288F FALL RISK ASSESSMENT DOCD: CPT | Mod: CPTII,S$GLB,, | Performed by: NURSE PRACTITIONER

## 2022-12-28 PROCEDURE — 1160F RVW MEDS BY RX/DR IN RCRD: CPT | Mod: CPTII,S$GLB,, | Performed by: NURSE PRACTITIONER

## 2022-12-28 PROCEDURE — 99214 OFFICE O/P EST MOD 30 MIN: CPT | Mod: S$GLB,,, | Performed by: NURSE PRACTITIONER

## 2022-12-28 PROCEDURE — 1101F PR PT FALLS ASSESS DOC 0-1 FALLS W/OUT INJ PAST YR: ICD-10-PCS | Mod: CPTII,S$GLB,, | Performed by: NURSE PRACTITIONER

## 2022-12-28 PROCEDURE — 99214 PR OFFICE/OUTPT VISIT, EST, LEVL IV, 30-39 MIN: ICD-10-PCS | Mod: S$GLB,,, | Performed by: NURSE PRACTITIONER

## 2022-12-28 PROCEDURE — 1159F MED LIST DOCD IN RCRD: CPT | Mod: CPTII,S$GLB,, | Performed by: NURSE PRACTITIONER

## 2022-12-28 PROCEDURE — 3008F PR BODY MASS INDEX (BMI) DOCUMENTED: ICD-10-PCS | Mod: CPTII,S$GLB,, | Performed by: NURSE PRACTITIONER

## 2022-12-28 PROCEDURE — 3008F BODY MASS INDEX DOCD: CPT | Mod: CPTII,S$GLB,, | Performed by: NURSE PRACTITIONER

## 2022-12-28 PROCEDURE — 1160F PR REVIEW ALL MEDS BY PRESCRIBER/CLIN PHARMACIST DOCUMENTED: ICD-10-PCS | Mod: CPTII,S$GLB,, | Performed by: NURSE PRACTITIONER

## 2022-12-28 PROCEDURE — 3079F DIAST BP 80-89 MM HG: CPT | Mod: CPTII,S$GLB,, | Performed by: NURSE PRACTITIONER

## 2022-12-28 PROCEDURE — 1101F PT FALLS ASSESS-DOCD LE1/YR: CPT | Mod: CPTII,S$GLB,, | Performed by: NURSE PRACTITIONER

## 2022-12-28 PROCEDURE — 3079F PR MOST RECENT DIASTOLIC BLOOD PRESSURE 80-89 MM HG: ICD-10-PCS | Mod: CPTII,S$GLB,, | Performed by: NURSE PRACTITIONER

## 2022-12-28 PROCEDURE — 3074F SYST BP LT 130 MM HG: CPT | Mod: CPTII,S$GLB,, | Performed by: NURSE PRACTITIONER

## 2022-12-28 PROCEDURE — 3074F PR MOST RECENT SYSTOLIC BLOOD PRESSURE < 130 MM HG: ICD-10-PCS | Mod: CPTII,S$GLB,, | Performed by: NURSE PRACTITIONER

## 2022-12-28 PROCEDURE — 3288F PR FALLS RISK ASSESSMENT DOCUMENTED: ICD-10-PCS | Mod: CPTII,S$GLB,, | Performed by: NURSE PRACTITIONER

## 2022-12-28 RX ORDER — LOSARTAN POTASSIUM AND HYDROCHLOROTHIAZIDE 25; 100 MG/1; MG/1
1 TABLET ORAL DAILY
Qty: 90 TABLET | Refills: 1 | Status: SHIPPED | OUTPATIENT
Start: 2022-12-28 | End: 2023-08-22 | Stop reason: SDUPTHER

## 2022-12-28 NOTE — PROGRESS NOTES
SUBJECTIVE:      Patient ID: Delgado Espinoza is a 67 y.o. female.    Chief Complaint: Hypertension    Patient is here today to f/u on htn. She is doing well on her current meds. She has her colonoscopy scheduled, due for a mammo next month, due for a DEXA next year.     Hypertension  This is a chronic problem. The current episode started more than 1 year ago. The problem is unchanged. The problem is controlled. Pertinent negatives include no anxiety, chest pain, headaches, malaise/fatigue, orthopnea, palpitations, peripheral edema or shortness of breath. Past treatments include angiotensin blockers and diuretics. The current treatment provides moderate improvement.     Past Surgical History:   Procedure Laterality Date    AUGMENTATION OF BREAST      BREAST BIOPSY      BREAST CYST EXCISION      BREAST SURGERY      MYOMECTOMY      RIGHT OOPHORECTOMY       Family History   Problem Relation Age of Onset    Stroke Mother       Social History     Socioeconomic History    Marital status: Single   Tobacco Use    Smoking status: Never    Smokeless tobacco: Never   Substance and Sexual Activity    Alcohol use: Yes     Comment: socially    Drug use: No     Current Outpatient Medications   Medication Sig Dispense Refill    aspirin 81 MG Chew Take 81 mg by mouth once daily.      fish oil-omega-3 fatty acids 300-1,000 mg capsule Take 2 g by mouth once daily.      multivitamin (THERAGRAN) per tablet Take 1 tablet by mouth once daily.      losartan-hydrochlorothiazide 100-25 mg (HYZAAR) 100-25 mg per tablet Take 1 tablet by mouth once daily. 90 tablet 1     No current facility-administered medications for this visit.     Review of patient's allergies indicates:  No Known Allergies   Past Medical History:   Diagnosis Date    Hypertension      Past Surgical History:   Procedure Laterality Date    AUGMENTATION OF BREAST      BREAST BIOPSY      BREAST CYST EXCISION      BREAST SURGERY      MYOMECTOMY      RIGHT OOPHORECTOMY    "      Review of Systems   Constitutional:  Negative for appetite change, chills, diaphoresis, malaise/fatigue and unexpected weight change.   HENT:  Negative for ear discharge, hearing loss, trouble swallowing and voice change.    Eyes:  Negative for photophobia and pain.   Respiratory:  Negative for chest tightness, shortness of breath and stridor.    Cardiovascular:  Negative for chest pain, palpitations and orthopnea.   Gastrointestinal:  Negative for abdominal pain, blood in stool and vomiting.   Endocrine: Negative for cold intolerance and heat intolerance.   Genitourinary:  Negative for difficulty urinating and flank pain.   Musculoskeletal:  Negative for joint swelling and neck stiffness.   Skin:  Negative for pallor.   Neurological:  Negative for dizziness, speech difficulty, weakness and headaches.   Hematological:  Does not bruise/bleed easily.   Psychiatric/Behavioral:  Negative for confusion, dysphoric mood, self-injury, sleep disturbance and suicidal ideas. The patient is not nervous/anxious.     OBJECTIVE:      Vitals:    12/28/22 1408 12/28/22 1425   BP: (!) 140/90 126/86   Pulse: 83    Temp: 99 °F (37.2 °C)    SpO2: 98%    Weight: 73 kg (161 lb)    Height: 5' 6" (1.676 m)      Physical Exam  Vitals and nursing note reviewed.   Constitutional:       General: She is not in acute distress.     Appearance: She is well-developed.   HENT:      Head: Normocephalic and atraumatic.      Right Ear: Tympanic membrane normal.      Left Ear: Tympanic membrane normal.      Nose: Nose normal.      Mouth/Throat:      Pharynx: Uvula midline.   Eyes:      General: Lids are normal.      Conjunctiva/sclera: Conjunctivae normal.      Pupils: Pupils are equal, round, and reactive to light.      Right eye: Pupil is round and reactive.      Left eye: Pupil is round and reactive.   Neck:      Thyroid: No thyromegaly.      Vascular: No JVD.      Trachea: Trachea normal.   Cardiovascular:      Rate and Rhythm: Normal rate and " regular rhythm.      Pulses: Normal pulses.      Heart sounds: Normal heart sounds.   Pulmonary:      Effort: Pulmonary effort is normal. No tachypnea or respiratory distress.      Breath sounds: Normal breath sounds. No wheezing or rales.   Abdominal:      General: Bowel sounds are normal.      Palpations: Abdomen is soft.      Tenderness: There is no abdominal tenderness.   Musculoskeletal:         General: Normal range of motion.      Cervical back: Normal range of motion and neck supple.      Right lower leg: No edema.      Left lower leg: No edema.   Lymphadenopathy:      Cervical: No cervical adenopathy.   Skin:     General: Skin is warm and dry.      Findings: No rash.   Neurological:      Mental Status: She is alert and oriented to person, place, and time.   Psychiatric:         Mood and Affect: Mood normal.         Speech: Speech normal.         Behavior: Behavior normal. Behavior is cooperative.         Thought Content: Thought content normal.         Judgment: Judgment normal.      Telephone on 12/19/2022   Component Date Value Ref Range Status    Glucose 12/20/2022 91  65 - 99 mg/dL Final    Comment:               Fasting reference interval         BUN 12/20/2022 15  7 - 25 mg/dL Final    Creatinine 12/20/2022 0.86  0.50 - 1.05 mg/dL Final    eGFR 12/20/2022 74  > OR = 60 mL/min/1.73m2 Final    Comment: The eGFR is based on the CKD-EPI 2021 equation. To calculate   the new eGFR from a previous Creatinine or Cystatin C  result, go to https://www.kidney.org/professionals/  kdoqi/gfr%5Fcalculator      BUN/Creatinine Ratio 12/20/2022 NOT APPLICABLE  6 - 22 (calc) Final    Sodium 12/20/2022 142  135 - 146 mmol/L Final    Potassium 12/20/2022 4.0  3.5 - 5.3 mmol/L Final    Chloride 12/20/2022 106  98 - 110 mmol/L Final    CO2 12/20/2022 31  20 - 32 mmol/L Final    Calcium 12/20/2022 11.6 (H)  8.6 - 10.4 mg/dL Final    Total Protein 12/20/2022 7.7  6.1 - 8.1 g/dL Final    Albumin 12/20/2022 4.4  3.6 - 5.1 g/dL  Final    Globulin, Total 12/20/2022 3.3  1.9 - 3.7 g/dL (calc) Final    Albumin/Globulin Ratio 12/20/2022 1.3  1.0 - 2.5 (calc) Final    Total Bilirubin 12/20/2022 0.4  0.2 - 1.2 mg/dL Final    Alkaline Phosphatase 12/20/2022 102  37 - 153 U/L Final    AST 12/20/2022 16  10 - 35 U/L Final    ALT 12/20/2022 14  6 - 29 U/L Final    TSH 12/20/2022 3.22  0.40 - 4.50 mIU/L Final    T4, Free 12/20/2022 1.2  0.8 - 1.8 ng/dL Final   ]    Last visit note, most recent available labs, and health maintenance reviewed    Assessment:       1. Essential hypertension    2. Screening mammogram, encounter for    3. Screening for osteoporosis    4. Post-menopausal        Plan:       Essential hypertension  -     losartan-hydrochlorothiazide 100-25 mg (HYZAAR) 100-25 mg per tablet; Take 1 tablet by mouth once daily.  Dispense: 90 tablet; Refill: 1    Screening mammogram, encounter for  -     Mammo Digital Screening Bilat w/ Winston; Future; Expected date: 12/28/2023    Screening for osteoporosis  -     DXA Bone Density Spine And Hip; Future; Expected date: 06/01/2023    Post-menopausal  -     DXA Bone Density Spine And Hip; Future; Expected date: 06/01/2023        Follow up in about 6 months (around 6/28/2023) for htn .      12/28/2022 KENNEY Castillo, FNP

## 2022-12-31 ENCOUNTER — OFFICE VISIT (OUTPATIENT)
Dept: URGENT CARE | Facility: CLINIC | Age: 67
End: 2022-12-31
Payer: MEDICARE

## 2022-12-31 VITALS
WEIGHT: 158.38 LBS | BODY MASS INDEX: 25.45 KG/M2 | RESPIRATION RATE: 18 BRPM | DIASTOLIC BLOOD PRESSURE: 77 MMHG | SYSTOLIC BLOOD PRESSURE: 146 MMHG | OXYGEN SATURATION: 100 % | TEMPERATURE: 99 F | HEART RATE: 89 BPM | HEIGHT: 66 IN

## 2022-12-31 DIAGNOSIS — J02.9 SORE THROAT: Primary | ICD-10-CM

## 2022-12-31 DIAGNOSIS — R05.9 COUGH, UNSPECIFIED TYPE: ICD-10-CM

## 2022-12-31 DIAGNOSIS — U07.1 COVID: ICD-10-CM

## 2022-12-31 DIAGNOSIS — R49.0 HOARSE: ICD-10-CM

## 2022-12-31 DIAGNOSIS — U07.1 COVID-19 VIRUS DETECTED: ICD-10-CM

## 2022-12-31 LAB
CTP QC/QA: YES
FLUAV AG NPH QL: NEGATIVE
FLUBV AG NPH QL: NEGATIVE
S PYO RRNA THROAT QL PROBE: NEGATIVE
SARS-COV-2 AG RESP QL IA.RAPID: POSITIVE

## 2022-12-31 PROCEDURE — 87804 INFLUENZA ASSAY W/OPTIC: CPT | Mod: QW,,,

## 2022-12-31 PROCEDURE — 1159F MED LIST DOCD IN RCRD: CPT | Mod: CPTII,S$GLB,,

## 2022-12-31 PROCEDURE — 1159F PR MEDICATION LIST DOCUMENTED IN MEDICAL RECORD: ICD-10-PCS | Mod: CPTII,S$GLB,,

## 2022-12-31 PROCEDURE — 99214 PR OFFICE/OUTPT VISIT, EST, LEVL IV, 30-39 MIN: ICD-10-PCS | Mod: S$GLB,,,

## 2022-12-31 PROCEDURE — 99214 OFFICE O/P EST MOD 30 MIN: CPT | Mod: S$GLB,,,

## 2022-12-31 PROCEDURE — 3008F BODY MASS INDEX DOCD: CPT | Mod: CPTII,S$GLB,,

## 2022-12-31 PROCEDURE — 1125F PR PAIN SEVERITY QUANTIFIED, PAIN PRESENT: ICD-10-PCS | Mod: CPTII,S$GLB,,

## 2022-12-31 PROCEDURE — 3077F PR MOST RECENT SYSTOLIC BLOOD PRESSURE >= 140 MM HG: ICD-10-PCS | Mod: CPTII,S$GLB,,

## 2022-12-31 PROCEDURE — 1125F AMNT PAIN NOTED PAIN PRSNT: CPT | Mod: CPTII,S$GLB,,

## 2022-12-31 PROCEDURE — 87880 STREP A ASSAY W/OPTIC: CPT | Mod: QW,,,

## 2022-12-31 PROCEDURE — 1160F RVW MEDS BY RX/DR IN RCRD: CPT | Mod: CPTII,S$GLB,,

## 2022-12-31 PROCEDURE — 87811 SARS-COV-2 COVID19 W/OPTIC: CPT | Mod: QW,S$GLB,,

## 2022-12-31 PROCEDURE — 3078F DIAST BP <80 MM HG: CPT | Mod: CPTII,S$GLB,,

## 2022-12-31 PROCEDURE — 87880 POCT RAPID STREP A: ICD-10-PCS | Mod: QW,,,

## 2022-12-31 PROCEDURE — 1160F PR REVIEW ALL MEDS BY PRESCRIBER/CLIN PHARMACIST DOCUMENTED: ICD-10-PCS | Mod: CPTII,S$GLB,,

## 2022-12-31 PROCEDURE — 3077F SYST BP >= 140 MM HG: CPT | Mod: CPTII,S$GLB,,

## 2022-12-31 PROCEDURE — 87811 SARS CORONAVIRUS 2 ANTIGEN POCT, MANUAL READ: ICD-10-PCS | Mod: QW,S$GLB,,

## 2022-12-31 PROCEDURE — 87804 POCT INFLUENZA A/B: ICD-10-PCS | Mod: 59,QW,,

## 2022-12-31 PROCEDURE — 3078F PR MOST RECENT DIASTOLIC BLOOD PRESSURE < 80 MM HG: ICD-10-PCS | Mod: CPTII,S$GLB,,

## 2022-12-31 PROCEDURE — 3008F PR BODY MASS INDEX (BMI) DOCUMENTED: ICD-10-PCS | Mod: CPTII,S$GLB,,

## 2022-12-31 RX ORDER — BENZONATATE 200 MG/1
200 CAPSULE ORAL 3 TIMES DAILY PRN
Qty: 15 CAPSULE | Refills: 0 | Status: SHIPPED | OUTPATIENT
Start: 2022-12-31 | End: 2023-01-05

## 2022-12-31 RX ORDER — AZELASTINE 1 MG/ML
1 SPRAY, METERED NASAL 2 TIMES DAILY
Qty: 30 ML | Refills: 0 | Status: SHIPPED | OUTPATIENT
Start: 2022-12-31 | End: 2023-08-22

## 2022-12-31 NOTE — PROGRESS NOTES
"Subjective:       Patient ID: Delgado Espinoza is a 67 y.o. female.    Vitals:  height is 5' 6" (1.676 m) and weight is 71.8 kg (158 lb 6.4 oz). Her oral temperature is 98.7 °F (37.1 °C). Her blood pressure is 146/77 (abnormal) and her pulse is 89. Her respiration is 18 and oxygen saturation is 94% (abnormal).     Chief Complaint: Cough and Sore Throat    Pt states that her symptoms started on thursday  . Patient states that her symptoms are the following: sore throat, cough, hoarse, throat pain, swollen throat, patient states that her pain level is a 10 in her throat when she coughs and when she isn't coughing its a 7 . Patient states that she has taken motrin, vitamin c ,yesterday was last dose.  Patient denies any other symptoms.     Cough  This is a new problem. The current episode started in the past 7 days. The problem has been gradually worsening. The problem occurs every few hours. The cough is Productive of sputum. Associated symptoms include a sore throat. Pertinent negatives include no chest pain, chills, ear pain, fever or shortness of breath. Associated symptoms comments: Hoarse, pain throat, swollen throat. Treatments tried: motrin, vitamin c. The treatment provided no relief.   Constitution: Negative for activity change, appetite change, chills, sweating and fever.   HENT:  Positive for sore throat. Negative for ear pain, sinus pain and sinus pressure.    Neck: Negative for neck pain.   Cardiovascular:  Negative for chest pain.   Eyes:  Negative for blurred vision.   Respiratory:  Positive for cough. Negative for chest tightness and shortness of breath.    Gastrointestinal:  Negative for abdominal pain.   Neurological:  Negative for dizziness and history of vertigo.     Objective:      Physical Exam   Constitutional: She is oriented to person, place, and time. She appears well-developed. No distress.   HENT:   Head: Normocephalic.   Nose: Nose normal.   Mouth/Throat: Mucous membranes are moist. No " oropharyngeal exudate or posterior oropharyngeal erythema.   Eyes: Conjunctivae and EOM are normal.   Cardiovascular: Normal rate and normal heart sounds.   Pulmonary/Chest: Effort normal and breath sounds normal. No respiratory distress. She has no wheezes. She has no rhonchi.   Neurological: no focal deficit. She is alert and oriented to person, place, and time.   Skin: Skin is warm, dry and not diaphoretic. Capillary refill takes 2 to 3 seconds.   Psychiatric: Her behavior is normal. Mood normal.       Assessment:       1. Sore throat    2. Cough, unspecified type    3. Hoarse          Plan:         Sore throat  -     POCT rapid strep A    Cough, unspecified type  -     POCT rapid strep A  -     SARS Coronavirus 2 Antigen, POCT Manual Read  -     POCT Influenza A/B    Hoarse  -     POCT rapid strep A         2  Pt presents with cough and sore throat x 2 days, covid positive, pt is not vaccinated, Covid risk score 2, informed pt on risk versus benefit of paxlovid therapy, pt reports main symptoms is a sore throat, pt not interested in paxlovid at this time, will continue supportive treatment, lungs clear on exam, denies sob or cp, will continue supportive treatment.

## 2022-12-31 NOTE — PATIENT INSTRUCTIONS
Symptomatic treatment to include:    Rest, increase fluid intake to include electrolyte replacement  Ibuprofen/Tylenol as directed for fever, sore throat, body aches  Zrytec and flonase for sinus symptoms  Tessalon perles cough pills as needed day or night  Mucinex D over the counter as directed for sinus congestion.  Coricidin HBP if you have high blood pressure.  Warm, salt water gargles, over the counter throat lozenges or sprays as desires.   ER for difficulty breathing not relieved by rest, excessive lethargy and/or change in mental status, oxygen level less than 93% or worsening of symptoms.   Follow CDC isolation guidelines as provided  Follow up with PCP if symptoms persist.

## 2023-01-11 ENCOUNTER — ANESTHESIA EVENT (OUTPATIENT)
Dept: ENDOSCOPY | Facility: HOSPITAL | Age: 68
End: 2023-01-11
Payer: MEDICARE

## 2023-01-11 ENCOUNTER — HOSPITAL ENCOUNTER (OUTPATIENT)
Facility: HOSPITAL | Age: 68
Discharge: HOME | End: 2023-01-11
Attending: INTERNAL MEDICINE | Admitting: INTERNAL MEDICINE
Payer: MEDICARE

## 2023-01-11 ENCOUNTER — ANESTHESIA (OUTPATIENT)
Dept: ENDOSCOPY | Facility: HOSPITAL | Age: 68
End: 2023-01-11
Payer: MEDICARE

## 2023-01-11 VITALS
OXYGEN SATURATION: 98 % | SYSTOLIC BLOOD PRESSURE: 141 MMHG | TEMPERATURE: 98 F | RESPIRATION RATE: 18 BRPM | HEART RATE: 78 BPM | DIASTOLIC BLOOD PRESSURE: 82 MMHG

## 2023-01-11 DIAGNOSIS — K63.5 POLYP OF COLON, UNSPECIFIED PART OF COLON, UNSPECIFIED TYPE: Primary | ICD-10-CM

## 2023-01-11 DIAGNOSIS — Z12.11 COLON CANCER SCREENING: ICD-10-CM

## 2023-01-11 DIAGNOSIS — K57.90 DIVERTICULOSIS: ICD-10-CM

## 2023-01-11 DIAGNOSIS — K64.8 INTERNAL HEMORRHOIDS: ICD-10-CM

## 2023-01-11 PROCEDURE — 25000003 PHARM REV CODE 250: Performed by: INTERNAL MEDICINE

## 2023-01-11 PROCEDURE — 45385 PR COLONOSCOPY,REMV LESN,SNARE: ICD-10-PCS | Mod: PT,,, | Performed by: INTERNAL MEDICINE

## 2023-01-11 PROCEDURE — 37000008 HC ANESTHESIA 1ST 15 MINUTES: Performed by: INTERNAL MEDICINE

## 2023-01-11 PROCEDURE — 37000009 HC ANESTHESIA EA ADD 15 MINS: Performed by: INTERNAL MEDICINE

## 2023-01-11 PROCEDURE — 45380 COLONOSCOPY AND BIOPSY: CPT | Mod: 59,PT,, | Performed by: INTERNAL MEDICINE

## 2023-01-11 PROCEDURE — 88305 TISSUE EXAM BY PATHOLOGIST: CPT | Performed by: PATHOLOGY

## 2023-01-11 PROCEDURE — D9220A PRA ANESTHESIA: ICD-10-PCS | Mod: PT,ANES,, | Performed by: ANESTHESIOLOGY

## 2023-01-11 PROCEDURE — D9220A PRA ANESTHESIA: ICD-10-PCS | Mod: PT,CRNA,, | Performed by: NURSE ANESTHETIST, CERTIFIED REGISTERED

## 2023-01-11 PROCEDURE — 88305 TISSUE EXAM BY PATHOLOGIST: ICD-10-PCS | Mod: 26,,, | Performed by: PATHOLOGY

## 2023-01-11 PROCEDURE — D9220A PRA ANESTHESIA: Mod: PT,ANES,, | Performed by: ANESTHESIOLOGY

## 2023-01-11 PROCEDURE — D9220A PRA ANESTHESIA: Mod: PT,CRNA,, | Performed by: NURSE ANESTHETIST, CERTIFIED REGISTERED

## 2023-01-11 PROCEDURE — 63600175 PHARM REV CODE 636 W HCPCS: Performed by: NURSE ANESTHETIST, CERTIFIED REGISTERED

## 2023-01-11 PROCEDURE — 45385 COLONOSCOPY W/LESION REMOVAL: CPT | Mod: PT,,, | Performed by: INTERNAL MEDICINE

## 2023-01-11 PROCEDURE — 88305 TISSUE EXAM BY PATHOLOGIST: CPT | Mod: 26,,, | Performed by: PATHOLOGY

## 2023-01-11 PROCEDURE — 27201089 HC SNARE, DISP (ANY): Performed by: INTERNAL MEDICINE

## 2023-01-11 PROCEDURE — 45380 PR COLONOSCOPY,BIOPSY: ICD-10-PCS | Mod: 59,PT,, | Performed by: INTERNAL MEDICINE

## 2023-01-11 PROCEDURE — 25000003 PHARM REV CODE 250: Performed by: NURSE ANESTHETIST, CERTIFIED REGISTERED

## 2023-01-11 PROCEDURE — 45380 COLONOSCOPY AND BIOPSY: CPT | Mod: 59,PT | Performed by: INTERNAL MEDICINE

## 2023-01-11 PROCEDURE — 45385 COLONOSCOPY W/LESION REMOVAL: CPT | Mod: PT | Performed by: INTERNAL MEDICINE

## 2023-01-11 PROCEDURE — 27201012 HC FORCEPS, HOT/COLD, DISP: Performed by: INTERNAL MEDICINE

## 2023-01-11 RX ORDER — LIDOCAINE HCL/PF 100 MG/5ML
SYRINGE (ML) INTRAVENOUS
Status: DISCONTINUED | OUTPATIENT
Start: 2023-01-11 | End: 2023-01-11

## 2023-01-11 RX ORDER — PROPOFOL 10 MG/ML
VIAL (ML) INTRAVENOUS
Status: DISCONTINUED | OUTPATIENT
Start: 2023-01-11 | End: 2023-01-11

## 2023-01-11 RX ORDER — SODIUM CHLORIDE 9 MG/ML
INJECTION, SOLUTION INTRAVENOUS CONTINUOUS
Status: DISCONTINUED | OUTPATIENT
Start: 2023-01-11 | End: 2023-01-11 | Stop reason: HOSPADM

## 2023-01-11 RX ORDER — ONDANSETRON 2 MG/ML
INJECTION INTRAMUSCULAR; INTRAVENOUS
Status: DISCONTINUED | OUTPATIENT
Start: 2023-01-11 | End: 2023-01-11

## 2023-01-11 RX ADMIN — PROPOFOL 100 MG: 10 INJECTION, EMULSION INTRAVENOUS at 09:01

## 2023-01-11 RX ADMIN — LIDOCAINE HYDROCHLORIDE 75 MG: 20 INJECTION INTRAVENOUS at 09:01

## 2023-01-11 RX ADMIN — PROPOFOL 30 MG: 10 INJECTION, EMULSION INTRAVENOUS at 09:01

## 2023-01-11 RX ADMIN — PROPOFOL 30 MG: 10 INJECTION, EMULSION INTRAVENOUS at 10:01

## 2023-01-11 RX ADMIN — SODIUM CHLORIDE: 9 INJECTION, SOLUTION INTRAVENOUS at 08:01

## 2023-01-11 RX ADMIN — GLYCOPYRROLATE 0.2 MG: 0.2 INJECTION, SOLUTION INTRAMUSCULAR; INTRAVITREAL at 09:01

## 2023-01-11 RX ADMIN — ONDANSETRON 4 MG: 2 INJECTION INTRAMUSCULAR; INTRAVENOUS at 09:01

## 2023-01-11 NOTE — H&P
CC: Screening for colorectal cancer    67 year old female with above. States that symptoms are absent, no alleviating/exacerbating factors. No family history of colorectal CA. No personal history of polyps. No bleeding or weight loss.     ROS:  No headache, no fever/chills, no chest pain/SOB, no nausea/vomiting/diarrhea/constipation/GI bleeding/abdominal pain, no dysuria/hematuria.    VSSAF   Exam:   Alert and oriented x 3; no apparent distress   PERRLA, sclera anicteric  CV: Regular rate/rhythm, normal PMI   Lungs: Clear bilaterally with no wheeze/rales   Abdomen: Soft, NT/ND, normal bowel sounds   Ext: No cyanosis, clubbing     Impression:   As above    Plan:   Proceed with endoscopy. Further recs to follow.

## 2023-01-11 NOTE — TRANSFER OF CARE
Anesthesia Transfer of Care Note    Patient: Delgado Espinoza    Procedure(s) Performed: Procedure(s) (LRB):  COLONOSCOPY (N/A)    Patient location: PACU    Anesthesia Type: general    Transport from OR: Transported from OR on room air with adequate spontaneous ventilation    Post pain: adequate analgesia    Post assessment: no apparent anesthetic complications and tolerated procedure well    Post vital signs: stable    Level of consciousness: sedated    Nausea/Vomiting: no nausea/vomiting    Complications: none    Transfer of care protocol was followed      Last vitals:   Visit Vitals  Breastfeeding No

## 2023-01-11 NOTE — ANESTHESIA POSTPROCEDURE EVALUATION
Anesthesia Post Evaluation    Patient: Delgado Espinoza    Procedure(s) Performed: Procedure(s) (LRB):  COLONOSCOPY (N/A)    Final Anesthesia Type: general      Patient location during evaluation: PACU  Patient participation: Yes- Able to Participate  Level of consciousness: awake and alert  Post-procedure vital signs: reviewed and stable  Pain management: adequate  Airway patency: patent    PONV status at discharge: No PONV  Anesthetic complications: no      Cardiovascular status: hemodynamically stable  Respiratory status: unassisted and room air  Hydration status: euvolemic  Follow-up not needed.          Vitals Value Taken Time   /82 01/11/23 1025   Temp 36.5 °C (97.7 °F) 01/11/23 1015   Pulse 78 01/11/23 1025   Resp 18 01/11/23 1025   SpO2 98 % 01/11/23 1025         Event Time   Out of Recovery 10:45:06         Pain/Carrie Score: Carrie Score: 10 (1/11/2023 10:25 AM)

## 2023-01-11 NOTE — ANESTHESIA PREPROCEDURE EVALUATION
01/11/2023  Delgado Espinoza is a 67 y.o., female.      Pre-op Assessment    I have reviewed the Patient Summary Reports.     I have reviewed the Nursing Notes. I have reviewed the NPO Status.   I have reviewed the Medications.     Review of Systems  Anesthesia Hx:  No problems with previous Anesthesia    Social:  Non-Smoker    Hematology/Oncology:  Hematology Normal   Oncology Normal     EENT/Dental:EENT/Dental Normal   Cardiovascular:   Hypertension    Pulmonary:  Pulmonary Normal    Renal/:  Renal/ Normal     Hepatic/GI:   Bowel Prep.    Musculoskeletal:  Musculoskeletal Normal    Neurological:  Neurology Normal    Endocrine:  Endocrine Normal    Dermatological:  Skin Normal    Psych:  Psychiatric Normal           Physical Exam  General: Well nourished, Cooperative, Alert and Oriented    Airway:  Mallampati: II   Mouth Opening: Normal  TM Distance: Normal  Neck ROM: Normal ROM  Oropharynx: Torus, on Palate    Dental:  Intact        Anesthesia Plan  Type of Anesthesia, risks & benefits discussed:    Anesthesia Type: Gen Natural Airway  Intra-op Monitoring Plan: Standard ASA Monitors  Induction:  IV  Informed Consent: Informed consent signed with the Patient and all parties understand the risks and agree with anesthesia plan.  All questions answered.   ASA Score: 2  Day of Surgery Review of History & Physical: H&P Update referred to the surgeon/provider.    Ready For Surgery From Anesthesia Perspective.     .

## 2023-01-11 NOTE — PLAN OF CARE
Vss, maryellen po fluids, denies pain, ambulates easily. IV removed, catheter intact. Discharge instructions provided and states understanding. States ready to go home.  Discharged from facility with family per wheelchair.

## 2023-01-11 NOTE — [employer]ATION PATIENT INSTRUCTIONS
Discharge Summary/Instructions after an Endoscopic Procedure  Patient Name: Delgado Espinoza  Patient MRN: [id number]  Patient YOB: 1955 Wednesday, January 11, 2023  Sudhir Garcia MD  Dear patient,  As a result of recent federal legislation (The Federal Cures Act), you may   receive lab or pathology results from your procedure in your [facility]   account before your physician is able to contact you. Your physician or   their representative will relay the results to you with their   recommendations at their soonest availability.  Thank you,  RESTRICTIONS:  During your procedure today, you received medications for sedation.  These   medications may affect your judgment, balance and coordination.  Therefore,   for 24 hours, you have the following restrictions:   - DO NOT drive a car, operate machinery, make legal/financial decisions,   sign important papers or drink alcohol.    ACTIVITY:  Today: no heavy lifting, straining or running due to procedural   sedation/anesthesia.  The following day: return to full activity including work.  DIET:  Eat and drink normally unless instructed otherwise.     TREATMENT FOR COMMON SIDE EFFECTS:  - Mild abdominal pain, nausea, belching, bloating or excessive gas:  rest,   eat lightly and use a heating pad.  - Sore Throat: treat with throat lozenges and/or gargle with warm salt   water.  - Because air was used during the procedure, expelling large amounts of air   from your rectum or belching is normal.  - If a bowel prep was taken, you may not have a bowel movement for 1-3 days.    This is normal.  SYMPTOMS TO WATCH FOR AND REPORT TO YOUR PHYSICIAN:  1. Abdominal pain or bloating, other than gas cramps.  2. Chest pain.  3. Back pain.  4. Signs of infection such as: chills or fever occurring within 24 hours   after the procedure.  5. Rectal bleeding, which would show as bright red, maroon, or black stools.   (A tablespoon of blood from the rectum is not serious,  especially if   hemorrhoids are present.)  6. Vomiting.  7. Weakness or dizziness.  GO DIRECTLY TO THE NEAREST EMERGENCY ROOM IF YOU HAVE ANY OF THE FOLLOWING:      Difficulty breathing              Chills and/or fever over 101 F   Persistent vomiting and/or vomiting blood   Severe abdominal pain   Severe chest pain   Black, tarry stools   Bleeding- more than one tablespoon   Any other symptom or condition that you feel may need urgent attention  Your doctor recommends these additional instructions:  If any biopsies were taken, your doctors clinic will contact you in 1 to 2   weeks with any results.  - Patient has a contact number available for emergencies.  The signs and   symptoms of potential delayed complications were discussed with the   patient.  Return to normal activities tomorrow.  Written discharge   instructions were provided to the patient.   - High fiber diet.   - Continue present medications.   - Await pathology results.   - Repeat colonoscopy in 3 years for surveillance.   - Discharge patient to home (ambulatory).   - Return to GI office PRN.  For questions, problems or results please call your physician - Sudhir Garcia MD at Work:  [phone].  [facility] PHONE NUMBER: [phone]  IF A COMPLICATION OR EMERGENCY SITUATION ARISES AND YOU ARE UNABLE TO REACH   YOUR PHYSICIAN - GO DIRECTLY TO THE EMERGENCY ROOM.  Sudhir Garcia MD  1/11/2023 10:12:00 AM  This report has been verified and signed electronically.  Dear patient,  As a result of recent federal legislation (The Federal Cures Act), you may   receive lab or pathology results from your procedure in your [facility]   account before your physician is able to contact you. Your physician or   their representative will relay the results to you with their   recommendations at their soonest availability.  Thank you,  [employer]ATION

## 2023-01-17 LAB
FINAL PATHOLOGIC DIAGNOSIS: NORMAL
GROSS: NORMAL
Lab: NORMAL

## 2023-01-24 ENCOUNTER — HOSPITAL ENCOUNTER (OUTPATIENT)
Dept: RADIOLOGY | Facility: HOSPITAL | Age: 68
Discharge: HOME | End: 2023-01-24
Attending: OBSTETRICS & GYNECOLOGY
Payer: MEDICARE

## 2023-01-24 ENCOUNTER — OFFICE VISIT (OUTPATIENT)
Dept: OBSTETRICS AND GYNECOLOGY | Facility: CLINIC | Age: 68
End: 2023-01-24
Payer: MEDICARE

## 2023-01-24 VITALS
SYSTOLIC BLOOD PRESSURE: 134 MMHG | WEIGHT: 160.25 LBS | BODY MASS INDEX: 25.75 KG/M2 | HEIGHT: 66 IN | DIASTOLIC BLOOD PRESSURE: 82 MMHG

## 2023-01-24 DIAGNOSIS — Z12.4 CERVICAL CANCER SCREENING: ICD-10-CM

## 2023-01-24 DIAGNOSIS — Z12.31 ENCOUNTER FOR SCREENING MAMMOGRAM FOR BREAST CANCER: ICD-10-CM

## 2023-01-24 DIAGNOSIS — Z01.419 ENCOUNTER FOR GYNECOLOGICAL EXAMINATION (GENERAL) (ROUTINE) WITHOUT ABNORMAL FINDINGS: Primary | ICD-10-CM

## 2023-01-24 PROCEDURE — G0101 PR CA SCREEN;PELVIC/BREAST EXAM: ICD-10-PCS | Mod: S$GLB,,, | Performed by: OBSTETRICS & GYNECOLOGY

## 2023-01-24 PROCEDURE — 3288F FALL RISK ASSESSMENT DOCD: CPT | Mod: CPTII,S$GLB,, | Performed by: OBSTETRICS & GYNECOLOGY

## 2023-01-24 PROCEDURE — 99999 PR PBB SHADOW E&M-EST. PATIENT-LVL III: ICD-10-PCS | Mod: PBBFAC,,, | Performed by: OBSTETRICS & GYNECOLOGY

## 2023-01-24 PROCEDURE — 77067 SCR MAMMO BI INCL CAD: CPT | Mod: 26,,, | Performed by: RADIOLOGY

## 2023-01-24 PROCEDURE — 3008F BODY MASS INDEX DOCD: CPT | Mod: CPTII,S$GLB,, | Performed by: OBSTETRICS & GYNECOLOGY

## 2023-01-24 PROCEDURE — 77067 SCR MAMMO BI INCL CAD: CPT | Mod: TC,PN

## 2023-01-24 PROCEDURE — 3079F DIAST BP 80-89 MM HG: CPT | Mod: CPTII,S$GLB,, | Performed by: OBSTETRICS & GYNECOLOGY

## 2023-01-24 PROCEDURE — 88175 CYTOPATH C/V AUTO FLUID REDO: CPT | Performed by: OBSTETRICS & GYNECOLOGY

## 2023-01-24 PROCEDURE — 1126F PR PAIN SEVERITY QUANTIFIED, NO PAIN PRESENT: ICD-10-PCS | Mod: CPTII,S$GLB,, | Performed by: OBSTETRICS & GYNECOLOGY

## 2023-01-24 PROCEDURE — 1101F PR PT FALLS ASSESS DOC 0-1 FALLS W/OUT INJ PAST YR: ICD-10-PCS | Mod: CPTII,S$GLB,, | Performed by: OBSTETRICS & GYNECOLOGY

## 2023-01-24 PROCEDURE — 99999 PR PBB SHADOW E&M-EST. PATIENT-LVL III: CPT | Mod: PBBFAC,,, | Performed by: OBSTETRICS & GYNECOLOGY

## 2023-01-24 PROCEDURE — 77063 MAMMO DIGITAL SCREENING BILAT WITH TOMO: ICD-10-PCS | Mod: 26,,, | Performed by: RADIOLOGY

## 2023-01-24 PROCEDURE — 3075F PR MOST RECENT SYSTOLIC BLOOD PRESS GE 130-139MM HG: ICD-10-PCS | Mod: CPTII,S$GLB,, | Performed by: OBSTETRICS & GYNECOLOGY

## 2023-01-24 PROCEDURE — G0101 CA SCREEN;PELVIC/BREAST EXAM: HCPCS | Mod: S$GLB,,, | Performed by: OBSTETRICS & GYNECOLOGY

## 2023-01-24 PROCEDURE — 3075F SYST BP GE 130 - 139MM HG: CPT | Mod: CPTII,S$GLB,, | Performed by: OBSTETRICS & GYNECOLOGY

## 2023-01-24 PROCEDURE — 1159F PR MEDICATION LIST DOCUMENTED IN MEDICAL RECORD: ICD-10-PCS | Mod: CPTII,S$GLB,, | Performed by: OBSTETRICS & GYNECOLOGY

## 2023-01-24 PROCEDURE — 77063 BREAST TOMOSYNTHESIS BI: CPT | Mod: 26,,, | Performed by: RADIOLOGY

## 2023-01-24 PROCEDURE — 1101F PT FALLS ASSESS-DOCD LE1/YR: CPT | Mod: CPTII,S$GLB,, | Performed by: OBSTETRICS & GYNECOLOGY

## 2023-01-24 PROCEDURE — 1159F MED LIST DOCD IN RCRD: CPT | Mod: CPTII,S$GLB,, | Performed by: OBSTETRICS & GYNECOLOGY

## 2023-01-24 PROCEDURE — 77067 MAMMO DIGITAL SCREENING BILAT WITH TOMO: ICD-10-PCS | Mod: 26,,, | Performed by: RADIOLOGY

## 2023-01-24 PROCEDURE — 1126F AMNT PAIN NOTED NONE PRSNT: CPT | Mod: CPTII,S$GLB,, | Performed by: OBSTETRICS & GYNECOLOGY

## 2023-01-24 PROCEDURE — 3008F PR BODY MASS INDEX (BMI) DOCUMENTED: ICD-10-PCS | Mod: CPTII,S$GLB,, | Performed by: OBSTETRICS & GYNECOLOGY

## 2023-01-24 PROCEDURE — 87624 HPV HI-RISK TYP POOLED RSLT: CPT | Performed by: OBSTETRICS & GYNECOLOGY

## 2023-01-24 PROCEDURE — 3079F PR MOST RECENT DIASTOLIC BLOOD PRESSURE 80-89 MM HG: ICD-10-PCS | Mod: CPTII,S$GLB,, | Performed by: OBSTETRICS & GYNECOLOGY

## 2023-01-24 PROCEDURE — 3288F PR FALLS RISK ASSESSMENT DOCUMENTED: ICD-10-PCS | Mod: CPTII,S$GLB,, | Performed by: OBSTETRICS & GYNECOLOGY

## 2023-01-24 NOTE — PROGRESS NOTES
Chief Complaint   Patient presents with    Well Woman     Hasn't had one in 10 years and has not been sexually active, so requests small speculum       History and Physical:  Delgado Espinoza is a 67 y.o. F who presents today for her routine annual GYN exam. The patient has no Gynecology complaints.    Annual:   Last Pap: 10yr   History of abnormal pap: never  Last Mammogram: 2022 BIRADS 2, [place]-Cuzick 3.66%  Colonosocpy: 2023, repeat 3 yr  DEXA: 2020 wnl  PCP: Malcom Vigil NP orders routine labs 2022    Allergies: Review of patient's allergies indicates:  No Known Allergies  Past Medical History:   Diagnosis Date    Hypertension      Past Surgical History:   Procedure Laterality Date    AUGMENTATION OF BREAST      BREAST BIOPSY      BREAST CYST EXCISION      BREAST SURGERY      COLONOSCOPY N/A 2023    Procedure: COLONOSCOPY;  Surgeon: Sudhir James MD;  Location: [facility];  Service: Endoscopy;  Laterality: N/A;    MYOMECTOMY      RIGHT OOPHORECTOMY       MEDS:   Current Outpatient Medications on File Prior to Visit   Medication Sig Dispense Refill    aspirin 81 MG Chew Take 81 mg by mouth once daily.      fish oil-omega-3 fatty acids 300-1,000 mg capsule Take 2 g by mouth once daily.      losartan-hydrochlorothiazide 100-25 mg (HYZAAR) 100-25 mg per tablet Take 1 tablet by mouth once daily. 90 tablet 1    multivitamin (THERAGRAN) per tablet Take 1 tablet by mouth once daily.      azelastine (ASTELIN) 137 mcg (0.1 %) nasal spray 1 spray (137 mcg total) by Nasal route 2 (two) times daily. for 7 days 30 mL 0     No current facility-administered medications on file prior to visit.     OB History          2    Para   2    Term   2            AB        Living             SAB        IAB        Ectopic        Multiple        Live Births                   Social History     Socioeconomic History    Marital status: Single   Tobacco Use    Smoking status: Never    Smokeless tobacco: Never  "  Substance and Sexual Activity    Alcohol use: Yes     Comment: rare    Drug use: No    Sexual activity: Not Currently     Family History   Problem Relation Age of Onset    Stroke Mother     Breast cancer Neg Hx     Ovarian cancer Neg Hx        Past medical and surgical history reviewed.   I have reviewed the patient's medical history in detail and updated the computerized patient record.    Review of System:   General: no chills, fever, night sweats, weight gain or weight loss  Breasts: no new or changing breast lumps, nipple discharge or masses.  Gastrointestinal: no abdominal pain, change in bowel habits, or black or bloody stools  Genito-Urinary: no incontinence, urinary frequency/urgency or vulvar/vaginal symptoms, pelvic pain or abnormal vaginal bleeding.    Physical Exam:   /82   Ht 5' 6" (1.676 m)   Wt 72.7 kg (160 lb 4.4 oz)   BMI 25.87 kg/m²   Body mass index is 25.87 kg/m².  Constitutional: She appears alert and responsive. She appears well-developed, well-groomed, and well-nourished. No distress.  Breasts: are symmetrical.  Right breast exhibits no inverted nipple, no mass, no nipple discharge, no skin change and no tenderness.  Left breast exhibits no inverted nipple, no mass, no nipple discharge, no skin change and no tenderness.  Abdominal: Soft. She exhibits no distension, hernias or masses. There is no tenderness. No enlargement of liver edge or spleen.  There is no rebound and no guarding.   Genitourinary:    External rectal exam shows no thrombosed external hemorrhoids, no lesions.     Pelvic exam was performed with patient supine.   No labial fusion, and symmetrical.    There is no rash, lesion or injury on the right labia.   There is no rash, lesion or injury on the left labia.   No bleeding and no signs of injury around the vaginal introitus, urethral meatus is normal size and without prolapse or lesions, urethra well supported. The cervix is visualized with no discharge, lesions or " friability.   No vaginal discharge found.    No significant Cystocele, Enterocele or rectocele, and cervix and uterus well supported.   Bimanual exam:   The urethra is normal to palpation and there are no palpable vaginal wall masses.   Uterus is not deviated, not enlarged, not fixed, normal shape and not tender.   Cervix exhibits no motion tenderness.    Right adnexum displays no mass or nodularity and no tenderness.   Left adnexum displays no mass or nodularity and no tenderness.    Assessment/Plan:   Encounter for gynecological examination (general) (routine) without abnormal findings    Cervical cancer screening  -     Liquid-Based Pap Smear, Screening  -     HPV High Risk Genotypes, PCR    Encounter for screening mammogram for breast cancer  -     Mammo Digital Screening Bilat w/ Winston; Future; Expected date: 01/24/2023        Follow up in 1 year.

## 2023-01-25 ENCOUNTER — PATIENT MESSAGE (OUTPATIENT)
Dept: FAMILY MEDICINE | Facility: CLINIC | Age: 68
End: 2023-01-25

## 2023-01-31 LAB
HPV HR 12 DNA SPEC QL NAA+PROBE: POSITIVE
HPV16 AG SPEC QL: NEGATIVE
HPV18 DNA SPEC QL NAA+PROBE: NEGATIVE

## 2023-02-02 LAB
FINAL PATHOLOGIC DIAGNOSIS: NORMAL
Lab: NORMAL

## 2023-02-10 ENCOUNTER — HOSPITAL ENCOUNTER (OUTPATIENT)
Dept: RADIOLOGY | Facility: CLINIC | Age: 68
Discharge: HOME | End: 2023-02-10
Attending: NURSE PRACTITIONER
Payer: MEDICARE

## 2023-02-10 DIAGNOSIS — Z13.820 SCREENING FOR OSTEOPOROSIS: ICD-10-CM

## 2023-02-10 DIAGNOSIS — Z78.0 POST-MENOPAUSAL: ICD-10-CM

## 2023-02-10 PROCEDURE — 77080 DXA BONE DENSITY AXIAL: CPT | Mod: TC,PO

## 2023-02-10 PROCEDURE — 77080 DEXA BONE DENSITY SPINE HIP: ICD-10-PCS | Mod: 26,,, | Performed by: RADIOLOGY

## 2023-02-10 PROCEDURE — 77080 DXA BONE DENSITY AXIAL: CPT | Mod: 26,,, | Performed by: RADIOLOGY

## 2023-02-13 ENCOUNTER — TELEPHONE (OUTPATIENT)
Dept: FAMILY MEDICINE | Facility: CLINIC | Age: 68
End: 2023-02-13

## 2023-02-13 NOTE — TELEPHONE ENCOUNTER
----- Message from Malcom Vigil NP sent at 2/12/2023  6:37 PM CST -----  Bone density scan shows osteopenia, which means your bones are less dense than normal but not quite osteoporosis. I recommend taking at least calcium 1200mg and Vitamin D of 1000 IU daily. In addition, I recommend light weight exercises to prevent progression to osteoporosis. We can repeat the bone density scan in 2-4 yrs.

## 2023-06-14 ENCOUNTER — TELEPHONE (OUTPATIENT)
Dept: FAMILY MEDICINE | Facility: CLINIC | Age: 68
End: 2023-06-14

## 2023-06-14 DIAGNOSIS — R79.89 ABNORMAL TSH: ICD-10-CM

## 2023-06-14 DIAGNOSIS — I10 ESSENTIAL HYPERTENSION: ICD-10-CM

## 2023-06-14 DIAGNOSIS — Z00.00 PREVENTATIVE HEALTH CARE: Primary | ICD-10-CM

## 2023-08-16 ENCOUNTER — TELEPHONE (OUTPATIENT)
Dept: FAMILY MEDICINE | Facility: CLINIC | Age: 68
End: 2023-08-16

## 2023-08-16 DIAGNOSIS — Z00.00 PREVENTATIVE HEALTH CARE: ICD-10-CM

## 2023-08-16 DIAGNOSIS — Z78.0 POST-MENOPAUSAL: ICD-10-CM

## 2023-08-16 DIAGNOSIS — I10 ESSENTIAL HYPERTENSION: Primary | ICD-10-CM

## 2023-08-16 DIAGNOSIS — R79.89 ABNORMAL TSH: ICD-10-CM

## 2023-08-18 ENCOUNTER — PATIENT MESSAGE (OUTPATIENT)
Dept: FAMILY MEDICINE | Facility: CLINIC | Age: 68
End: 2023-08-18

## 2023-08-18 LAB
ALBUMIN SERPL-MCNC: 4.2 G/DL (ref 3.6–5.1)
ALBUMIN/GLOB SERPL: 1.2 (CALC) (ref 1–2.5)
ALP SERPL-CCNC: 90 U/L (ref 37–153)
ALT SERPL-CCNC: 16 U/L (ref 6–29)
APPEARANCE UR: CLEAR
AST SERPL-CCNC: 20 U/L (ref 10–35)
BASOPHILS # BLD AUTO: 28 CELLS/UL (ref 0–200)
BASOPHILS NFR BLD AUTO: 0.5 %
BILIRUB SERPL-MCNC: 0.4 MG/DL (ref 0.2–1.2)
BILIRUB UR QL STRIP: NEGATIVE
BUN SERPL-MCNC: 22 MG/DL (ref 7–25)
BUN/CREAT SERPL: ABNORMAL (CALC) (ref 6–22)
CALCIUM SERPL-MCNC: 11.9 MG/DL (ref 8.6–10.4)
CHLORIDE SERPL-SCNC: 105 MMOL/L (ref 98–110)
CHOLEST SERPL-MCNC: 175 MG/DL
CHOLEST/HDLC SERPL: 2.8 (CALC)
CO2 SERPL-SCNC: 31 MMOL/L (ref 20–32)
COLOR UR: YELLOW
CREAT SERPL-MCNC: 0.93 MG/DL (ref 0.5–1.05)
EGFR: 67 ML/MIN/1.73M2
EOSINOPHIL # BLD AUTO: 594 CELLS/UL (ref 15–500)
EOSINOPHIL NFR BLD AUTO: 10.8 %
ERYTHROCYTE [DISTWIDTH] IN BLOOD BY AUTOMATED COUNT: 14.6 % (ref 11–15)
GLOBULIN SER CALC-MCNC: 3.5 G/DL (CALC) (ref 1.9–3.7)
GLUCOSE SERPL-MCNC: 93 MG/DL (ref 65–99)
GLUCOSE UR QL STRIP: NEGATIVE
HCT VFR BLD AUTO: 41.6 % (ref 35–45)
HDLC SERPL-MCNC: 62 MG/DL
HGB BLD-MCNC: 13.4 G/DL (ref 11.7–15.5)
HGB UR QL STRIP: NEGATIVE
KETONES UR QL STRIP: NEGATIVE
LDLC SERPL CALC-MCNC: 100 MG/DL (CALC)
LEUKOCYTE ESTERASE UR QL STRIP: NEGATIVE
LYMPHOCYTES # BLD AUTO: 1925 CELLS/UL (ref 850–3900)
LYMPHOCYTES NFR BLD AUTO: 35 %
MCH RBC QN AUTO: 26.1 PG (ref 27–33)
MCHC RBC AUTO-ENTMCNC: 32.2 G/DL (ref 32–36)
MCV RBC AUTO: 80.9 FL (ref 80–100)
MONOCYTES # BLD AUTO: 583 CELLS/UL (ref 200–950)
MONOCYTES NFR BLD AUTO: 10.6 %
NEUTROPHILS # BLD AUTO: 2371 CELLS/UL (ref 1500–7800)
NEUTROPHILS NFR BLD AUTO: 43.1 %
NITRITE UR QL STRIP: NEGATIVE
NONHDLC SERPL-MCNC: 113 MG/DL (CALC)
PH UR STRIP: 6.5 [PH] (ref 5–8)
PLATELET # BLD AUTO: 231 THOUSAND/UL (ref 140–400)
PMV BLD REES-ECKER: 11.7 FL (ref 7.5–12.5)
POTASSIUM SERPL-SCNC: 4.7 MMOL/L (ref 3.5–5.3)
PROT SERPL-MCNC: 7.7 G/DL (ref 6.1–8.1)
PROT UR QL STRIP: NEGATIVE
RBC # BLD AUTO: 5.14 MILLION/UL (ref 3.8–5.1)
SODIUM SERPL-SCNC: 140 MMOL/L (ref 135–146)
SP GR UR STRIP: 1.01 (ref 1–1.03)
TRIGL SERPL-MCNC: 52 MG/DL
TSH SERPL-ACNC: 3.42 MIU/L (ref 0.4–4.5)
WBC # BLD AUTO: 5.5 THOUSAND/UL (ref 3.8–10.8)

## 2023-08-21 NOTE — PROGRESS NOTES
SUBJECTIVE:      Patient ID: Delgado Espinoza is a 67 y.o. female.    Chief Complaint: Hypertension    Patient is here today to f/u on htn. She is monitoring her bp at home and readings have been 120-130/70-80's     Hypertension  This is a chronic problem. The current episode started more than 1 year ago. The problem is unchanged. The problem is controlled. Pertinent negatives include no anxiety, chest pain, headaches, malaise/fatigue, orthopnea, palpitations, peripheral edema or shortness of breath. Past treatments include angiotensin blockers and diuretics. The current treatment provides moderate improvement.       Past Surgical History:   Procedure Laterality Date    AUGMENTATION OF BREAST      BREAST BIOPSY      BREAST CYST EXCISION      BREAST SURGERY      COLONOSCOPY N/A 01/11/2023    Procedure: COLONOSCOPY;  Surgeon: Sudhir James MD;  Location: [facility];  Service: Endoscopy;  Laterality: N/A;    MYOMECTOMY      OOPHORECTOMY      RIGHT OOPHORECTOMY       Family History   Problem Relation Age of Onset    Stroke Mother     Breast cancer Neg Hx     Ovarian cancer Neg Hx       Social History     Socioeconomic History    Marital status: Single   Tobacco Use    Smoking status: Never     Passive exposure: Never    Smokeless tobacco: Never   Substance and Sexual Activity    Alcohol use: Yes     Comment: rare    Drug use: No    Sexual activity: Not Currently     Current Outpatient Medications   Medication Sig Dispense Refill    aspirin 81 MG Chew Take 81 mg by mouth once daily.      fish oil-omega-3 fatty acids 300-1,000 mg capsule Take 2 g by mouth once daily.      multivitamin (THERAGRAN) per tablet Take 1 tablet by mouth once daily.      losartan-hydrochlorothiazide 100-25 mg (HYZAAR) 100-25 mg per tablet Take 1 tablet by mouth once daily. 90 tablet 1     No current facility-administered medications for this visit.     Review of patient's allergies indicates:  No Known Allergies   Past Medical History:  "  Diagnosis Date    Hypertension      Past Surgical History:   Procedure Laterality Date    AUGMENTATION OF BREAST      BREAST BIOPSY      BREAST CYST EXCISION      BREAST SURGERY      COLONOSCOPY N/A 01/11/2023    Procedure: COLONOSCOPY;  Surgeon: Sudhir James MD;  Location: [facility];  Service: Endoscopy;  Laterality: N/A;    MYOMECTOMY      OOPHORECTOMY      RIGHT OOPHORECTOMY         Review of Systems   Constitutional:  Negative for appetite change, chills, diaphoresis, malaise/fatigue and unexpected weight change.   HENT:  Negative for ear discharge, hearing loss, trouble swallowing and voice change.    Eyes:  Negative for photophobia and pain.   Respiratory:  Negative for chest tightness, shortness of breath and stridor.    Cardiovascular:  Negative for chest pain, palpitations and orthopnea.   Gastrointestinal:  Negative for abdominal pain, blood in stool and vomiting.   Endocrine: Negative for cold intolerance and heat intolerance.   Genitourinary:  Negative for difficulty urinating and flank pain.   Musculoskeletal:  Negative for joint swelling and neck stiffness.   Skin:  Negative for pallor.   Neurological:  Negative for dizziness, speech difficulty, weakness, light-headedness and headaches.   Hematological:  Does not bruise/bleed easily.   Psychiatric/Behavioral:  Negative for confusion, dysphoric mood, self-injury, sleep disturbance and suicidal ideas. The patient is not nervous/anxious.       OBJECTIVE:      Vitals:    08/22/23 0932 08/22/23 0952   BP: (!) 148/94 132/84   BP Location: Left arm    Pulse: 84    Temp: 98.1 °F (36.7 °C)    SpO2: 99%    Weight: 69.4 kg (153 lb)    Height: 5' 6" (1.676 m)      Physical Exam  Vitals and nursing note reviewed.   Constitutional:       General: She is not in acute distress.     Appearance: She is well-developed.   HENT:      Head: Normocephalic and atraumatic.      Right Ear: Tympanic membrane normal.      Left Ear: Tympanic membrane normal.      Nose: Nose " normal.      Mouth/Throat:      Pharynx: Uvula midline.   Eyes:      General: Lids are normal.      Conjunctiva/sclera: Conjunctivae normal.      Pupils: Pupils are equal, round, and reactive to light.      Right eye: Pupil is round and reactive.      Left eye: Pupil is round and reactive.   Neck:      Thyroid: No thyromegaly.      Vascular: No JVD.      Trachea: Trachea normal.   Cardiovascular:      Rate and Rhythm: Normal rate and regular rhythm.      Pulses: Normal pulses.      Heart sounds: Normal heart sounds. No murmur heard.  Pulmonary:      Effort: Pulmonary effort is normal. No tachypnea or respiratory distress.      Breath sounds: Normal breath sounds.   Abdominal:      General: Bowel sounds are normal.      Palpations: Abdomen is soft.      Tenderness: There is no abdominal tenderness.   Musculoskeletal:         General: Normal range of motion.      Cervical back: Normal range of motion and neck supple.      Right lower leg: No edema.      Left lower leg: No edema.   Lymphadenopathy:      Cervical: No cervical adenopathy.   Skin:     General: Skin is warm and dry.      Findings: No rash.   Neurological:      Mental Status: She is alert and oriented to person, place, and time.   Psychiatric:         Mood and Affect: Mood normal.         Speech: Speech normal.         Behavior: Behavior normal. Behavior is cooperative.         Thought Content: Thought content normal.         Judgment: Judgment normal.        Telephone on 08/16/2023   Component Date Value Ref Range Status    TSH 08/17/2023 3.42  0.40 - 4.50 mIU/L Final    Color, UA 08/17/2023 YELLOW  YELLOW Final    Appearance, UA 08/17/2023 CLEAR  CLEAR Final    Specific Gravity, UA 08/17/2023 1.014  1.001 - 1.035 Final    pH, UA 08/17/2023 6.5  5.0 - 8.0 Final    Glucose, UA 08/17/2023 NEGATIVE  NEGATIVE Final    Bilirubin, UA 08/17/2023 NEGATIVE  NEGATIVE Final    Ketones, UA 08/17/2023 NEGATIVE  NEGATIVE Final    Occult Blood UA 08/17/2023 NEGATIVE   NEGATIVE Final    Protein, UA 08/17/2023 NEGATIVE  NEGATIVE Final    Nitrite, UA 08/17/2023 NEGATIVE  NEGATIVE Final    Leukocytes, UA 08/17/2023 NEGATIVE  NEGATIVE Final    WBC 08/17/2023 5.5  3.8 - 10.8 Thousand/uL Final    RBC 08/17/2023 5.14 (H)  3.80 - 5.10 Million/uL Final    Hemoglobin 08/17/2023 13.4  11.7 - 15.5 g/dL Final    Hematocrit 08/17/2023 41.6  35.0 - 45.0 % Final    MCV 08/17/2023 80.9  80.0 - 100.0 fL Final    MCH 08/17/2023 26.1 (L)  27.0 - 33.0 pg Final    MCHC 08/17/2023 32.2  32.0 - 36.0 g/dL Final    RDW 08/17/2023 14.6  11.0 - 15.0 % Final    Platelets 08/17/2023 231  140 - 400 Thousand/uL Final    MPV 08/17/2023 11.7  7.5 - 12.5 fL Final    Neutrophils, Abs 08/17/2023 2,371  1,500 - 7,800 cells/uL Final    Lymph # 08/17/2023 1,925  850 - 3,900 cells/uL Final    Mono # 08/17/2023 583  200 - 950 cells/uL Final    Eos # 08/17/2023 594 (H)  15 - 500 cells/uL Final    Baso # 08/17/2023 28  0 - 200 cells/uL Final    Neutrophils Relative 08/17/2023 43.1  % Final    Lymph % 08/17/2023 35.0  % Final    Mono % 08/17/2023 10.6  % Final    Eosinophil % 08/17/2023 10.8  % Final    Basophil % 08/17/2023 0.5  % Final    Glucose 08/17/2023 93  65 - 99 mg/dL Final    Comment:               Fasting reference interval         BUN 08/17/2023 22  7 - 25 mg/dL Final    Creatinine 08/17/2023 0.93  0.50 - 1.05 mg/dL Final    eGFR 08/17/2023 67  > OR = 60 mL/min/1.73m2 Final    BUN/Creatinine Ratio 08/17/2023 SEE NOTE:  6 - 22 (calc) Final    Comment:    Not Reported: BUN and Creatinine are within     reference range.            Sodium 08/17/2023 140  135 - 146 mmol/L Final    Potassium 08/17/2023 4.7  3.5 - 5.3 mmol/L Final    Chloride 08/17/2023 105  98 - 110 mmol/L Final    CO2 08/17/2023 31  20 - 32 mmol/L Final    Calcium 08/17/2023 11.9 (H)  8.6 - 10.4 mg/dL Final    Total Protein 08/17/2023 7.7  6.1 - 8.1 g/dL Final    Albumin 08/17/2023 4.2  3.6 - 5.1 g/dL Final    Globulin, Total 08/17/2023 3.5  1.9 - 3.7  g/dL (calc) Final    Albumin/Globulin Ratio 08/17/2023 1.2  1.0 - 2.5 (calc) Final    Total Bilirubin 08/17/2023 0.4  0.2 - 1.2 mg/dL Final    Alkaline Phosphatase 08/17/2023 90  37 - 153 U/L Final    AST 08/17/2023 20  10 - 35 U/L Final    ALT 08/17/2023 16  6 - 29 U/L Final    Cholesterol 08/17/2023 175  <200 mg/dL Final    HDL 08/17/2023 62  > OR = 50 mg/dL Final    Triglycerides 08/17/2023 52  <150 mg/dL Final    LDL Cholesterol 08/17/2023 100 (H)  mg/dL (calc) Final    Comment: Reference range: <100     Desirable range <100 mg/dL for primary prevention;    <70 mg/dL for patients with CHD or diabetic patients   with > or = 2 CHD risk factors.     LDL-C is now calculated using the Kentrell   calculation, which is a validated novel method providing   better accuracy than the Friedewald equation in the   estimation of LDL-C.   Luis Armando BRENNER et al. GUI. 2013;310(19): 6575-8525   (http://education.[employer]/faq/TQF097)      HDL/Cholesterol Ratio 08/17/2023 2.8  <5.0 (calc) Final    Non HDL Chol. (LDL+VLDL) 08/17/2023 113  <130 mg/dL (calc) Final    Comment: For patients with diabetes plus 1 major ASCVD risk   factor, treating to a non-HDL-C goal of <100 mg/dL   (LDL-C of <70 mg/dL) is considered a therapeutic   option.     ]    Last visit note, most recent available labs, and health maintenance reviewed    Assessment:       1. Essential hypertension    2. Serum calcium elevated    3. Post-menopausal    4. Vitamin D deficiency        Plan:       Essential hypertension  -     losartan-hydrochlorothiazide 100-25 mg (HYZAAR) 100-25 mg per tablet; Take 1 tablet by mouth once daily.  Dispense: 90 tablet; Refill: 1    Serum calcium elevated  -     PTH, intact; Future; Expected date: 08/22/2023  -     Vitamin D; Future; Expected date: 09/05/2023  -     Calcium, Ionized; Future; Expected date: 08/22/2023    Post-menopausal  -     Vitamin D; Future; Expected date: 09/05/2023    Vitamin D deficiency  -     Vitamin  D; Future; Expected date: 09/05/2023  She has been diagnosed with Vit d def in the past. Has been taking a otc D3 supplement. Does not take a calcium supplement. Will hold D 3 and recheck calcium       Follow up in about 6 months (around 2/22/2024) for htn .      8/22/2023 KENNEY Castillo, FNP

## 2023-08-22 ENCOUNTER — OFFICE VISIT (OUTPATIENT)
Dept: FAMILY MEDICINE | Facility: CLINIC | Age: 68
End: 2023-08-22
Payer: MEDICARE

## 2023-08-22 VITALS
TEMPERATURE: 98 F | WEIGHT: 153 LBS | OXYGEN SATURATION: 99 % | HEART RATE: 84 BPM | BODY MASS INDEX: 24.59 KG/M2 | DIASTOLIC BLOOD PRESSURE: 84 MMHG | SYSTOLIC BLOOD PRESSURE: 132 MMHG | HEIGHT: 66 IN

## 2023-08-22 DIAGNOSIS — E83.52 SERUM CALCIUM ELEVATED: ICD-10-CM

## 2023-08-22 DIAGNOSIS — I10 ESSENTIAL HYPERTENSION: Primary | ICD-10-CM

## 2023-08-22 DIAGNOSIS — E55.9 VITAMIN D DEFICIENCY: ICD-10-CM

## 2023-08-22 DIAGNOSIS — Z78.0 POST-MENOPAUSAL: ICD-10-CM

## 2023-08-22 PROCEDURE — 1159F MED LIST DOCD IN RCRD: CPT | Mod: CPTII,S$GLB,, | Performed by: NURSE PRACTITIONER

## 2023-08-22 PROCEDURE — 1159F PR MEDICATION LIST DOCUMENTED IN MEDICAL RECORD: ICD-10-PCS | Mod: CPTII,S$GLB,, | Performed by: NURSE PRACTITIONER

## 2023-08-22 PROCEDURE — 3079F PR MOST RECENT DIASTOLIC BLOOD PRESSURE 80-89 MM HG: ICD-10-PCS | Mod: CPTII,S$GLB,, | Performed by: NURSE PRACTITIONER

## 2023-08-22 PROCEDURE — 3079F DIAST BP 80-89 MM HG: CPT | Mod: CPTII,S$GLB,, | Performed by: NURSE PRACTITIONER

## 2023-08-22 PROCEDURE — 3075F PR MOST RECENT SYSTOLIC BLOOD PRESS GE 130-139MM HG: ICD-10-PCS | Mod: CPTII,S$GLB,, | Performed by: NURSE PRACTITIONER

## 2023-08-22 PROCEDURE — 3288F PR FALLS RISK ASSESSMENT DOCUMENTED: ICD-10-PCS | Mod: CPTII,S$GLB,, | Performed by: NURSE PRACTITIONER

## 2023-08-22 PROCEDURE — 3075F SYST BP GE 130 - 139MM HG: CPT | Mod: CPTII,S$GLB,, | Performed by: NURSE PRACTITIONER

## 2023-08-22 PROCEDURE — 1101F PR PT FALLS ASSESS DOC 0-1 FALLS W/OUT INJ PAST YR: ICD-10-PCS | Mod: CPTII,S$GLB,, | Performed by: NURSE PRACTITIONER

## 2023-08-22 PROCEDURE — 1160F RVW MEDS BY RX/DR IN RCRD: CPT | Mod: CPTII,S$GLB,, | Performed by: NURSE PRACTITIONER

## 2023-08-22 PROCEDURE — 3008F BODY MASS INDEX DOCD: CPT | Mod: CPTII,S$GLB,, | Performed by: NURSE PRACTITIONER

## 2023-08-22 PROCEDURE — 1160F PR REVIEW ALL MEDS BY PRESCRIBER/CLIN PHARMACIST DOCUMENTED: ICD-10-PCS | Mod: CPTII,S$GLB,, | Performed by: NURSE PRACTITIONER

## 2023-08-22 PROCEDURE — 3008F PR BODY MASS INDEX (BMI) DOCUMENTED: ICD-10-PCS | Mod: CPTII,S$GLB,, | Performed by: NURSE PRACTITIONER

## 2023-08-22 PROCEDURE — 99214 OFFICE O/P EST MOD 30 MIN: CPT | Mod: S$GLB,,, | Performed by: NURSE PRACTITIONER

## 2023-08-22 PROCEDURE — 3288F FALL RISK ASSESSMENT DOCD: CPT | Mod: CPTII,S$GLB,, | Performed by: NURSE PRACTITIONER

## 2023-08-22 PROCEDURE — 1101F PT FALLS ASSESS-DOCD LE1/YR: CPT | Mod: CPTII,S$GLB,, | Performed by: NURSE PRACTITIONER

## 2023-08-22 PROCEDURE — 99214 PR OFFICE/OUTPT VISIT, EST, LEVL IV, 30-39 MIN: ICD-10-PCS | Mod: S$GLB,,, | Performed by: NURSE PRACTITIONER

## 2023-08-22 RX ORDER — LOSARTAN POTASSIUM 25 MG/1
25 TABLET ORAL
COMMUNITY
End: 2023-08-22

## 2023-08-22 RX ORDER — LOSARTAN POTASSIUM AND HYDROCHLOROTHIAZIDE 25; 100 MG/1; MG/1
1 TABLET ORAL DAILY
Qty: 90 TABLET | Refills: 1 | Status: SHIPPED | OUTPATIENT
Start: 2023-08-22 | End: 2024-02-22 | Stop reason: ALTCHOICE

## 2023-09-15 LAB
25(OH)D3 SERPL-MCNC: 55 NG/ML (ref 30–100)
CA-I SERPL-MCNC: 5.9 MG/DL (ref 4.7–5.5)
PTH-INTACT SERPL-MCNC: 117 PG/ML (ref 16–77)

## 2023-09-18 ENCOUNTER — TELEPHONE (OUTPATIENT)
Dept: FAMILY MEDICINE | Facility: CLINIC | Age: 68
End: 2023-09-18

## 2023-09-18 ENCOUNTER — PATIENT MESSAGE (OUTPATIENT)
Dept: FAMILY MEDICINE | Facility: CLINIC | Age: 68
End: 2023-09-18

## 2023-09-18 DIAGNOSIS — R79.89 ELEVATED PTHRP LEVEL: Primary | ICD-10-CM

## 2023-09-18 NOTE — TELEPHONE ENCOUNTER
----- Message from Malcom Vigil NP sent at 9/18/2023 12:59 PM CDT -----  PTH and calcium are elevated. I would like to place a referral to endo for a work up. Please send back to me after notifying pt

## 2023-09-22 ENCOUNTER — TELEPHONE (OUTPATIENT)
Dept: ENDOCRINOLOGY | Facility: CLINIC | Age: 68
End: 2023-09-22

## 2023-09-29 ENCOUNTER — TELEPHONE (OUTPATIENT)
Dept: ENDOCRINOLOGY | Facility: CLINIC | Age: 68
End: 2023-09-29
Payer: MEDICARE

## 2023-09-29 NOTE — TELEPHONE ENCOUNTER
----- Message from Magnolia Monroy sent at 9/29/2023  1:42 PM CDT -----  Type:  Sooner Appointment Request    Caller is requesting a sooner appointment.  Caller declined first available appointment listed below.  Caller will not accept being placed on the waitlist and is requesting a message be sent to doctor.    Name of Caller:  Pt    When is the first available appointment?  11/27    Symptoms:  R79.89 (ICD-10-CM) - Elevated PTHrP level    Would the patient rather a call back or a response via [facility]?  Call back    Best Call Back Number:  [phone]    Additional Information:   Pt has an active referral for Jessy [facility]field.  Please call back to advise. Thanks!

## 2023-09-29 NOTE — TELEPHONE ENCOUNTER
Lm notifying pt Jessy is not endocrinology only DM management. Dr. Mendez booked until next year. Advised to call main [place] or [place]

## 2023-11-08 ENCOUNTER — PATIENT OUTREACH (OUTPATIENT)
Dept: ADMINISTRATIVE | Facility: HOSPITAL | Age: 68
End: 2023-11-08
Payer: MEDICARE

## 2023-11-08 DIAGNOSIS — Z12.31 ENCOUNTER FOR SCREENING MAMMOGRAM FOR BREAST CANCER: Primary | ICD-10-CM

## 2023-11-08 NOTE — PROGRESS NOTES
Population Health Chart Review & Patient Outreach Details    Outreach Performed: YES Telephone Not Successful    Additional Pop Health Notes:           Updates Requested / Reviewed:      Care Everywhere, , and Immunizations Reconciliation Completed or Queried: [place]         Health Maintenance Topics Overdue:    Health Maintenance Due   Topic Date Due    COVID-19 Vaccine (1) Never done    Shingles Vaccine (1 of 2) Never done    RSV Vaccine (Age 60+) (1 - 1-dose 60+ series) Never done    Pneumococcal Vaccines (Age 65+) (1 - PCV) Never done    Influenza Vaccine (1) Never done    Mammogram  01/24/2024         Health Maintenance Topic(s) Outreach Outcomes & Actions Taken:    Breast Cancer Screening - Outreach Outcomes & Actions Taken  : Mammogram Order Placed    Blood Pressure - Outreach Outcomes & Actions Taken  : Patient Will Call Back or Send Portal Message with [place]

## 2023-12-05 ENCOUNTER — TELEPHONE (OUTPATIENT)
Dept: FAMILY MEDICINE | Facility: CLINIC | Age: 68
End: 2023-12-05

## 2023-12-05 NOTE — TELEPHONE ENCOUNTER
Patient need a new referral to someone that will treat the pth because the last endocrinology referred doctor does not treat it.

## 2023-12-06 ENCOUNTER — PATIENT MESSAGE (OUTPATIENT)
Dept: FAMILY MEDICINE | Facility: CLINIC | Age: 68
End: 2023-12-06

## 2023-12-06 NOTE — TELEPHONE ENCOUNTER
Tried to call pt to discuss endo referral.  No answer and mail box is full so I sent a portal message.

## 2023-12-06 NOTE — TELEPHONE ENCOUNTER
Looks like they encouraged her to follow up with the main campus for endo since she has [employer] insurance she will likely need to follow up in the city or get on Dr Mendez's wait list in [place]

## 2023-12-25 ENCOUNTER — PATIENT MESSAGE (OUTPATIENT)
Dept: ADMINISTRATIVE | Facility: HOSPITAL | Age: 68
End: 2023-12-25
Payer: MEDICARE

## 2023-12-26 ENCOUNTER — PATIENT MESSAGE (OUTPATIENT)
Dept: ADMINISTRATIVE | Facility: HOSPITAL | Age: 68
End: 2023-12-26
Payer: MEDICARE

## 2023-12-26 ENCOUNTER — PATIENT OUTREACH (OUTPATIENT)
Dept: ADMINISTRATIVE | Facility: HOSPITAL | Age: 68
End: 2023-12-26
Payer: MEDICARE

## 2023-12-26 NOTE — PROGRESS NOTES
Population Health Chart Review & Patient Outreach Details    Outreach Performed: YES Portal    Additional [facility] Health Notes:    CAMPAIGN- Preventative Care Screening       Updates Requested / Reviewed:      Updated Care Coordination Note         Health Maintenance Topics Overdue:    Health Maintenance Due   Topic Date Due    COVID-19 Vaccine (1) Never done    Shingles Vaccine (1 of 2) Never done    RSV Vaccine (Age 60+ and Pregnant patients) (1 - 1-dose 60+ series) Never done    Pneumococcal Vaccines (Age 65+) (1 - PCV) Never done    Influenza Vaccine (1) Never done    Mammogram  01/24/2024         Health Maintenance Topic(s) Outreach Outcomes & Actions Taken:    Breast Cancer Screening - Outreach Outcomes & Actions Taken  : Mammogram Screening Scheduled

## 2024-01-11 DIAGNOSIS — Z00.00 ENCOUNTER FOR MEDICARE ANNUAL WELLNESS EXAM: ICD-10-CM

## 2024-01-31 ENCOUNTER — HOSPITAL ENCOUNTER (OUTPATIENT)
Dept: RADIOLOGY | Facility: HOSPITAL | Age: 69
Discharge: HOME | End: 2024-01-31
Attending: NURSE PRACTITIONER
Payer: MEDICARE

## 2024-01-31 DIAGNOSIS — Z12.31 ENCOUNTER FOR SCREENING MAMMOGRAM FOR BREAST CANCER: ICD-10-CM

## 2024-01-31 PROCEDURE — 77067 SCR MAMMO BI INCL CAD: CPT | Mod: 26,,, | Performed by: RADIOLOGY

## 2024-01-31 PROCEDURE — 77063 BREAST TOMOSYNTHESIS BI: CPT | Mod: 26,,, | Performed by: RADIOLOGY

## 2024-01-31 PROCEDURE — 77067 SCR MAMMO BI INCL CAD: CPT | Mod: TC

## 2024-02-05 ENCOUNTER — TELEPHONE (OUTPATIENT)
Dept: FAMILY MEDICINE | Facility: CLINIC | Age: 69
End: 2024-02-05
Payer: MEDICARE

## 2024-02-05 DIAGNOSIS — E83.52 SERUM CALCIUM ELEVATED: ICD-10-CM

## 2024-02-05 DIAGNOSIS — E55.9 VITAMIN D DEFICIENCY: Primary | ICD-10-CM

## 2024-02-05 DIAGNOSIS — R79.89 ELEVATED PTHRP LEVEL: ICD-10-CM

## 2024-02-05 NOTE — TELEPHONE ENCOUNTER
----- Message from Ella Tran sent at 2/5/2024 10:53 AM CST -----  - 10:23-pt has an upcoming appt and would like her labs sent to [facility]. Would also like a repeat pth level on there   [phone]

## 2024-02-06 NOTE — TELEPHONE ENCOUNTER
Cmp, vit D, pth intact, ionized calcium, cbc  She will also need imaging since she did not see endo. I will order at [facility]

## 2024-02-20 LAB
25(OH)D3 SERPL-MCNC: 37 NG/ML (ref 30–100)
ALBUMIN SERPL-MCNC: 4.2 G/DL (ref 3.6–5.1)
ALBUMIN/GLOB SERPL: 1.3 (CALC) (ref 1–2.5)
ALP SERPL-CCNC: 97 U/L (ref 37–153)
ALT SERPL-CCNC: 14 U/L (ref 6–29)
AST SERPL-CCNC: 19 U/L (ref 10–35)
BASOPHILS # BLD AUTO: 53 CELLS/UL (ref 0–200)
BASOPHILS NFR BLD AUTO: 0.9 %
BILIRUB SERPL-MCNC: 0.4 MG/DL (ref 0.2–1.2)
BUN SERPL-MCNC: 16 MG/DL (ref 7–25)
BUN/CREAT SERPL: ABNORMAL (CALC) (ref 6–22)
CA-I SERPL-MCNC: 5.9 MG/DL (ref 4.7–5.5)
CALCIUM SERPL-MCNC: 11.4 MG/DL (ref 8.6–10.4)
CHLORIDE SERPL-SCNC: 104 MMOL/L (ref 98–110)
CO2 SERPL-SCNC: 28 MMOL/L (ref 20–32)
CREAT SERPL-MCNC: 0.79 MG/DL (ref 0.5–1.05)
EGFR: 81 ML/MIN/1.73M2
EOSINOPHIL # BLD AUTO: 513 CELLS/UL (ref 15–500)
EOSINOPHIL NFR BLD AUTO: 8.7 %
ERYTHROCYTE [DISTWIDTH] IN BLOOD BY AUTOMATED COUNT: 14.7 % (ref 11–15)
GLOBULIN SER CALC-MCNC: 3.3 G/DL (CALC) (ref 1.9–3.7)
GLUCOSE SERPL-MCNC: 90 MG/DL (ref 65–99)
HCT VFR BLD AUTO: 40.7 % (ref 35–45)
HGB BLD-MCNC: 13 G/DL (ref 11.7–15.5)
LYMPHOCYTES # BLD AUTO: 2177 CELLS/UL (ref 850–3900)
LYMPHOCYTES NFR BLD AUTO: 36.9 %
MCH RBC QN AUTO: 25.7 PG (ref 27–33)
MCHC RBC AUTO-ENTMCNC: 31.9 G/DL (ref 32–36)
MCV RBC AUTO: 80.4 FL (ref 80–100)
MONOCYTES # BLD AUTO: 472 CELLS/UL (ref 200–950)
MONOCYTES NFR BLD AUTO: 8 %
NEUTROPHILS # BLD AUTO: 2685 CELLS/UL (ref 1500–7800)
NEUTROPHILS NFR BLD AUTO: 45.5 %
PLATELET # BLD AUTO: 261 THOUSAND/UL (ref 140–400)
PMV BLD REES-ECKER: 12.1 FL (ref 7.5–12.5)
POTASSIUM SERPL-SCNC: 4.7 MMOL/L (ref 3.5–5.3)
PROT SERPL-MCNC: 7.5 G/DL (ref 6.1–8.1)
PTH-INTACT SERPL-MCNC: 109 PG/ML (ref 16–77)
RBC # BLD AUTO: 5.06 MILLION/UL (ref 3.8–5.1)
SODIUM SERPL-SCNC: 141 MMOL/L (ref 135–146)
WBC # BLD AUTO: 5.9 THOUSAND/UL (ref 3.8–10.8)

## 2024-02-21 NOTE — PROGRESS NOTES
SUBJECTIVE:      Patient ID: Delgado Espinoza is a 68 y.o. female.    Chief Complaint: Hypertension    Patient is here today to f/u on htn and review labs. She was previously referred to endo for elevated PTH but did not schedule the appt. She says she is feeling good. No complaints.     Hypertension  This is a chronic problem. The current episode started more than 1 year ago. The problem is unchanged. The problem is controlled. Pertinent negatives include no anxiety, chest pain, headaches, malaise/fatigue, orthopnea, palpitations, peripheral edema or shortness of breath. Past treatments include angiotensin blockers and diuretics. The current treatment provides moderate improvement.   Sinus Problem  This is a new problem. The current episode started 1 to 4 weeks ago. The problem is unchanged. There has been no fever. Associated symptoms include congestion and sneezing. Pertinent negatives include no chills, diaphoresis, headaches or shortness of breath. Past treatments include nothing.       Past Surgical History:   Procedure Laterality Date    AUGMENTATION OF BREAST      BREAST BIOPSY      BREAST CYST EXCISION      BREAST SURGERY      COLONOSCOPY N/A 01/11/2023    Procedure: COLONOSCOPY;  Surgeon: Sudhir James MD;  Location: [facility];  Service: Endoscopy;  Laterality: N/A;    MYOMECTOMY      OOPHORECTOMY      RIGHT OOPHORECTOMY       Family History   Problem Relation Age of Onset    Stroke Mother     Breast cancer Neg Hx     Ovarian cancer Neg Hx       Social History     Socioeconomic History    Marital status: Single   Tobacco Use    Smoking status: Never     Passive exposure: Never    Smokeless tobacco: Never   Substance and Sexual Activity    Alcohol use: Yes     Comment: rare    Drug use: No    Sexual activity: Not Currently     Current Outpatient Medications   Medication Sig Dispense Refill    aspirin 81 MG Chew Take 81 mg by mouth once daily.      fish oil-omega-3 fatty acids 300-1,000 mg capsule  Take 2 g by mouth once daily.      multivitamin (THERAGRAN) per tablet Take 1 tablet by mouth once daily.      fluticasone propionate (FLONASE) 50 mcg/actuation nasal spray 1 spray (50 mcg total) by Each Nostril route once daily. 9.9 mL 0    levocetirizine (XYZAL) 5 MG tablet Take 1 tablet (5 mg total) by mouth every evening. 30 tablet 1    olmesartan-hydrochlorothiazide (BENICAR HCT) 40-12.5 mg Tab Take 1 tablet by mouth once daily. 30 tablet 1     No current facility-administered medications for this visit.     Review of patient's allergies indicates:  No Known Allergies   Past Medical History:   Diagnosis Date    Hypertension      Past Surgical History:   Procedure Laterality Date    AUGMENTATION OF BREAST      BREAST BIOPSY      BREAST CYST EXCISION      BREAST SURGERY      COLONOSCOPY N/A 01/11/2023    Procedure: COLONOSCOPY;  Surgeon: Sudhir James MD;  Location: [facility];  Service: Endoscopy;  Laterality: N/A;    MYOMECTOMY      OOPHORECTOMY      RIGHT OOPHORECTOMY         Review of Systems   Constitutional:  Negative for appetite change, chills, diaphoresis, malaise/fatigue and unexpected weight change.   HENT:  Positive for congestion, postnasal drip and sneezing. Negative for ear discharge, hearing loss, trouble swallowing and voice change.    Eyes:  Negative for photophobia and pain.   Respiratory:  Negative for chest tightness, shortness of breath and stridor.    Cardiovascular:  Negative for chest pain, palpitations and orthopnea.   Gastrointestinal:  Negative for abdominal pain, blood in stool and vomiting.   Endocrine: Negative for cold intolerance, heat intolerance, polydipsia, polyphagia and polyuria.   Genitourinary:  Negative for difficulty urinating, dysuria and flank pain.   Musculoskeletal:  Negative for joint swelling and neck stiffness.   Skin:  Negative for pallor.   Neurological:  Negative for dizziness, speech difficulty, weakness and headaches.   Hematological:  Does not bruise/bleed  "easily.   Psychiatric/Behavioral:  Negative for confusion, dysphoric mood, self-injury, sleep disturbance and suicidal ideas. The patient is not nervous/anxious.       OBJECTIVE:      Vitals:    02/22/24 0957 02/22/24 1028   BP: (!) (P) 164/100 (!) 130/90   Pulse: 77    SpO2: 98%    Weight: 70.3 kg (155 lb)    Height: 5' 6" (1.676 m)      Physical Exam  Vitals and nursing note reviewed.   Constitutional:       General: She is not in acute distress.     Appearance: She is well-developed.   HENT:      Head: Normocephalic and atraumatic.      Right Ear: Tympanic membrane normal.      Left Ear: Tympanic membrane normal.      Nose: Nose normal.      Mouth/Throat:      Pharynx: Uvula midline.   Eyes:      General: Lids are normal.      Conjunctiva/sclera: Conjunctivae normal.      Pupils: Pupils are equal, round, and reactive to light.      Right eye: Pupil is round and reactive.      Left eye: Pupil is round and reactive.   Neck:      Thyroid: No thyromegaly.      Vascular: No carotid bruit or JVD.      Trachea: Trachea normal.   Cardiovascular:      Rate and Rhythm: Normal rate and regular rhythm.      Pulses: Normal pulses.      Heart sounds: Normal heart sounds. No murmur heard.  Pulmonary:      Effort: Pulmonary effort is normal. No tachypnea or respiratory distress.      Breath sounds: Normal breath sounds. No wheezing, rhonchi or rales.   Abdominal:      General: Bowel sounds are normal.      Palpations: Abdomen is soft.      Tenderness: There is no abdominal tenderness.   Musculoskeletal:         General: Normal range of motion.      Cervical back: Normal range of motion and neck supple.      Right lower leg: No edema.      Left lower leg: No edema.   Lymphadenopathy:      Cervical: No cervical adenopathy.   Skin:     General: Skin is warm and dry.      Findings: No rash.   Neurological:      Mental Status: She is alert and oriented to person, place, and time.   Psychiatric:         Mood and Affect: Mood normal.   "       Speech: Speech normal.         Behavior: Behavior normal. Behavior is cooperative.         Thought Content: Thought content normal.         Judgment: Judgment normal.        Telephone on 02/05/2024   Component Date Value Ref Range Status    Glucose 02/19/2024 90  65 - 99 mg/dL Final    Comment:               Fasting reference interval         BUN 02/19/2024 16  7 - 25 mg/dL Final    Creatinine 02/19/2024 0.79  0.50 - 1.05 mg/dL Final    eGFR 02/19/2024 81  > OR = 60 mL/min/1.73m2 Final    BUN/Creatinine Ratio 02/19/2024 SEE NOTE:  6 - 22 (calc) Final    Comment:    Not Reported: BUN and Creatinine are within     reference range.            Sodium 02/19/2024 141  135 - 146 mmol/L Final    Potassium 02/19/2024 4.7  3.5 - 5.3 mmol/L Final    Chloride 02/19/2024 104  98 - 110 mmol/L Final    CO2 02/19/2024 28  20 - 32 mmol/L Final    Calcium 02/19/2024 11.4 (H)  8.6 - 10.4 mg/dL Final    Total Protein 02/19/2024 7.5  6.1 - 8.1 g/dL Final    Albumin 02/19/2024 4.2  3.6 - 5.1 g/dL Final    Globulin, Total 02/19/2024 3.3  1.9 - 3.7 g/dL (calc) Final    Albumin/Globulin Ratio 02/19/2024 1.3  1.0 - 2.5 (calc) Final    Total Bilirubin 02/19/2024 0.4  0.2 - 1.2 mg/dL Final    Alkaline Phosphatase 02/19/2024 97  37 - 153 U/L Final    AST 02/19/2024 19  10 - 35 U/L Final    ALT 02/19/2024 14  6 - 29 U/L Final    PTH, Intact 02/19/2024 109 (H)  16 - 77 pg/mL Final    Comment:    Interpretive Guide    Intact PTH           Calcium  ------------------    ----------           -------  Normal Parathyroid    Normal               Normal  Hypoparathyroidism    Low or Low Normal    Low  Hyperparathyroidism     Primary            Normal or High       High     Secondary          High                 Normal or Low     Tertiary           High                 High  Non-Parathyroid     Hypercalcemia      Low or Low Normal    High         Ionized Calcium 02/19/2024 5.9 (H)  4.7 - 5.5 mg/dL Final    WBC 02/19/2024 5.9  3.8 - 10.8  Thousand/uL Final    RBC 02/19/2024 5.06  3.80 - 5.10 Million/uL Final    Hemoglobin 02/19/2024 13.0  11.7 - 15.5 g/dL Final    Hematocrit 02/19/2024 40.7  35.0 - 45.0 % Final    MCV 02/19/2024 80.4  80.0 - 100.0 fL Final    MCH 02/19/2024 25.7 (L)  27.0 - 33.0 pg Final    MCHC 02/19/2024 31.9 (L)  32.0 - 36.0 g/dL Final    RDW 02/19/2024 14.7  11.0 - 15.0 % Final    Platelets 02/19/2024 261  140 - 400 Thousand/uL Final    MPV 02/19/2024 12.1  7.5 - 12.5 fL Final    Neutrophils, Abs 02/19/2024 2,685  1,500 - 7,800 cells/uL Final    Lymph # 02/19/2024 2,177  850 - 3,900 cells/uL Final    Mono # 02/19/2024 472  200 - 950 cells/uL Final    Eos # 02/19/2024 513 (H)  15 - 500 cells/uL Final    Baso # 02/19/2024 53  0 - 200 cells/uL Final    Neutrophils Relative 02/19/2024 45.5  % Final    Lymph % 02/19/2024 36.9  % Final    Mono % 02/19/2024 8.0  % Final    Eosinophil % 02/19/2024 8.7  % Final    Basophil % 02/19/2024 0.9  % Final    Vitamin D, 25-OH, Total 02/19/2024 37  30 - 100 ng/mL Final    Comment: Vitamin D Status         25-OH Vitamin D:     Deficiency:                    <20 ng/mL  Insufficiency:             20 - 29 ng/mL  Optimal:                 > or = 30 ng/mL     For 25-OH Vitamin D testing on patients on   D2-supplementation and patients for whom quantitation   of D2 and D3 fractions is required, the ScreenheroureD(TM)  25-OH VIT D, (D2,D3), LC/MS/MS is recommended: order   code 11125 (patients >2yrs).     See Note 1     Note 1     For additional information, please refer to   http://[email or web address]/faq/[email or web address]   (This link is being provided for informational/  educational purposes only.)     ]  Last visit note, most recent available labs, and health maintenance reviewed    Assessment:       1. Vitamin D deficiency    2. Elevated PTHrP level    3. Serum calcium elevated    4. Essential hypertension    5. Seasonal allergic rhinitis, unspecified trigger        Plan:       Vitamin D  deficiency  Cont otc supplement    Elevated PTHrP level  -     Ambulatory referral/consult to Endocrinology; Future; Expected date: 02/29/2024    Serum calcium elevated  -     Ambulatory referral/consult to Endocrinology; Future; Expected date: 02/29/2024    Essential hypertension  -  start   olmesartan-hydrochlorothiazide (BENICAR HCT) 40-12.5 mg Tab; Take 1 tablet by mouth once daily.  Dispense: 30 tablet; Refill: 1  Stop losartan  Seasonal allergic rhinitis, unspecified trigger  -  start   fluticasone propionate (FLONASE) 50 mcg/actuation nasal spray; 1 spray (50 mcg total) by Each Nostril route once daily.  Dispense: 9.9 mL; Refill: 0  -  tart   levocetirizine (XYZAL) 5 MG tablet; Take 1 tablet (5 mg total) by mouth every evening.  Dispense: 30 tablet; Refill: 1        Follow up in about 6 weeks (around 4/4/2024) for htn .      2/22/2024 KENNEY Castillo, FNP

## 2024-02-22 ENCOUNTER — OFFICE VISIT (OUTPATIENT)
Dept: FAMILY MEDICINE | Facility: CLINIC | Age: 69
End: 2024-02-22
Payer: MEDICARE

## 2024-02-22 VITALS
BODY MASS INDEX: 24.91 KG/M2 | WEIGHT: 155 LBS | SYSTOLIC BLOOD PRESSURE: 130 MMHG | HEART RATE: 77 BPM | HEIGHT: 66 IN | OXYGEN SATURATION: 98 % | DIASTOLIC BLOOD PRESSURE: 90 MMHG

## 2024-02-22 DIAGNOSIS — E55.9 VITAMIN D DEFICIENCY: Primary | ICD-10-CM

## 2024-02-22 DIAGNOSIS — E83.52 SERUM CALCIUM ELEVATED: ICD-10-CM

## 2024-02-22 DIAGNOSIS — I10 ESSENTIAL HYPERTENSION: ICD-10-CM

## 2024-02-22 DIAGNOSIS — J30.2 SEASONAL ALLERGIC RHINITIS, UNSPECIFIED TRIGGER: ICD-10-CM

## 2024-02-22 DIAGNOSIS — R79.89 ELEVATED PTHRP LEVEL: ICD-10-CM

## 2024-02-22 PROCEDURE — 3075F SYST BP GE 130 - 139MM HG: CPT | Mod: CPTII,S$GLB,, | Performed by: NURSE PRACTITIONER

## 2024-02-22 PROCEDURE — 3080F DIAST BP >= 90 MM HG: CPT | Mod: CPTII,S$GLB,, | Performed by: NURSE PRACTITIONER

## 2024-02-22 PROCEDURE — 1160F RVW MEDS BY RX/DR IN RCRD: CPT | Mod: CPTII,S$GLB,, | Performed by: NURSE PRACTITIONER

## 2024-02-22 PROCEDURE — 1159F MED LIST DOCD IN RCRD: CPT | Mod: CPTII,S$GLB,, | Performed by: NURSE PRACTITIONER

## 2024-02-22 PROCEDURE — 99214 OFFICE O/P EST MOD 30 MIN: CPT | Mod: S$GLB,,, | Performed by: NURSE PRACTITIONER

## 2024-02-22 PROCEDURE — 3008F BODY MASS INDEX DOCD: CPT | Mod: CPTII,S$GLB,, | Performed by: NURSE PRACTITIONER

## 2024-02-22 RX ORDER — LEVOCETIRIZINE DIHYDROCHLORIDE 5 MG/1
5 TABLET, FILM COATED ORAL NIGHTLY
Qty: 30 TABLET | Refills: 1 | Status: SHIPPED | OUTPATIENT
Start: 2024-02-22 | End: 2024-04-26

## 2024-02-22 RX ORDER — FLUTICASONE PROPIONATE 50 MCG
1 SPRAY, SUSPENSION (ML) NASAL DAILY
Qty: 9.9 ML | Refills: 0 | Status: SHIPPED | OUTPATIENT
Start: 2024-02-22

## 2024-02-22 RX ORDER — OLMESARTAN MEDOXOMIL AND HYDROCHLOROTHIAZIDE 40/12.5 40; 12.5 MG/1; MG/1
1 TABLET ORAL DAILY
Qty: 30 TABLET | Refills: 1 | Status: SHIPPED | OUTPATIENT
Start: 2024-02-22 | End: 2024-04-04 | Stop reason: ALTCHOICE

## 2024-03-19 ENCOUNTER — OFFICE VISIT (OUTPATIENT)
Dept: OPTOMETRY | Facility: CLINIC | Age: 69
End: 2024-03-19
Payer: MEDICARE

## 2024-03-19 DIAGNOSIS — H52.7 REFRACTIVE ERROR: ICD-10-CM

## 2024-03-19 DIAGNOSIS — H26.9 CORTICAL CATARACT: ICD-10-CM

## 2024-03-19 DIAGNOSIS — Z01.00 EXAMINATION OF EYES AND VISION: Primary | ICD-10-CM

## 2024-03-19 DIAGNOSIS — H25.13 NUCLEAR SCLEROSIS, BILATERAL: ICD-10-CM

## 2024-03-19 PROCEDURE — 1160F RVW MEDS BY RX/DR IN RCRD: CPT | Mod: CPTII,S$GLB,, | Performed by: OPTOMETRIST

## 2024-03-19 PROCEDURE — 92014 COMPRE OPH EXAM EST PT 1/>: CPT | Mod: S$GLB,,, | Performed by: OPTOMETRIST

## 2024-03-19 PROCEDURE — 92015 DETERMINE REFRACTIVE STATE: CPT | Mod: S$GLB,,, | Performed by: OPTOMETRIST

## 2024-03-19 PROCEDURE — 99999 PR PBB SHADOW E&M-EST. PATIENT-LVL II: CPT | Mod: PBBFAC,,, | Performed by: OPTOMETRIST

## 2024-03-19 PROCEDURE — 1101F PT FALLS ASSESS-DOCD LE1/YR: CPT | Mod: CPTII,S$GLB,, | Performed by: OPTOMETRIST

## 2024-03-19 PROCEDURE — 1159F MED LIST DOCD IN RCRD: CPT | Mod: CPTII,S$GLB,, | Performed by: OPTOMETRIST

## 2024-03-19 PROCEDURE — 1126F AMNT PAIN NOTED NONE PRSNT: CPT | Mod: CPTII,S$GLB,, | Performed by: OPTOMETRIST

## 2024-03-19 PROCEDURE — 3288F FALL RISK ASSESSMENT DOCD: CPT | Mod: CPTII,S$GLB,, | Performed by: OPTOMETRIST

## 2024-03-19 NOTE — PROGRESS NOTES
HPI    Pt presents for annual eye exam     States no current ocular complaints.     Refresh PRN OU     Last edited by Pablo Morgan, OD on 3/19/2024  9:54 AM.            Assessment /Plan     For exam results, see Encounter Report.    Examination of eyes and vision    Nuclear sclerosis, bilateral    Cortical cataract    Refractive error      1. Examination of eyes and vision    2. Nuclear sclerosis, bilateral  3. Cortical cataract  Mild NS OU, cortical spoking OS > OD  Not visually significant   Discussed possible ocular affects of cataracts. Acceptable BCVA OU.   Discussed treatment options. Surgery not recommended at this time.   Monitor yearly.     4. Refractive error  Happy with uncorrected distance and otc readers prn for near  Dispensed spec rx prn  Discussed cls candidacy -- schedule fitting if desired

## 2024-04-03 ENCOUNTER — OFFICE VISIT (OUTPATIENT)
Dept: ENDOCRINOLOGY | Facility: CLINIC | Age: 69
End: 2024-04-03
Payer: MEDICARE

## 2024-04-03 VITALS
BODY MASS INDEX: 24.84 KG/M2 | SYSTOLIC BLOOD PRESSURE: 156 MMHG | DIASTOLIC BLOOD PRESSURE: 98 MMHG | WEIGHT: 154.56 LBS | HEIGHT: 66 IN

## 2024-04-03 DIAGNOSIS — E21.0 PRIMARY HYPERPARATHYROIDISM: ICD-10-CM

## 2024-04-03 DIAGNOSIS — E83.52 SERUM CALCIUM ELEVATED: ICD-10-CM

## 2024-04-03 DIAGNOSIS — E21.3 HYPERPARATHYROIDISM: Primary | ICD-10-CM

## 2024-04-03 DIAGNOSIS — R79.89 ELEVATED PTHRP LEVEL: ICD-10-CM

## 2024-04-03 PROCEDURE — 3077F SYST BP >= 140 MM HG: CPT | Mod: CPTII,S$GLB,, | Performed by: INTERNAL MEDICINE

## 2024-04-03 PROCEDURE — 1101F PT FALLS ASSESS-DOCD LE1/YR: CPT | Mod: CPTII,S$GLB,, | Performed by: INTERNAL MEDICINE

## 2024-04-03 PROCEDURE — 3080F DIAST BP >= 90 MM HG: CPT | Mod: CPTII,S$GLB,, | Performed by: INTERNAL MEDICINE

## 2024-04-03 PROCEDURE — 99204 OFFICE O/P NEW MOD 45 MIN: CPT | Mod: S$GLB,,, | Performed by: INTERNAL MEDICINE

## 2024-04-03 PROCEDURE — 99999 PR PBB SHADOW E&M-EST. PATIENT-LVL IV: CPT | Mod: PBBFAC,,, | Performed by: INTERNAL MEDICINE

## 2024-04-03 PROCEDURE — 1126F AMNT PAIN NOTED NONE PRSNT: CPT | Mod: CPTII,S$GLB,, | Performed by: INTERNAL MEDICINE

## 2024-04-03 PROCEDURE — 1159F MED LIST DOCD IN RCRD: CPT | Mod: CPTII,S$GLB,, | Performed by: INTERNAL MEDICINE

## 2024-04-03 PROCEDURE — 1160F RVW MEDS BY RX/DR IN RCRD: CPT | Mod: CPTII,S$GLB,, | Performed by: INTERNAL MEDICINE

## 2024-04-03 PROCEDURE — 3288F FALL RISK ASSESSMENT DOCD: CPT | Mod: CPTII,S$GLB,, | Performed by: INTERNAL MEDICINE

## 2024-04-03 PROCEDURE — 3008F BODY MASS INDEX DOCD: CPT | Mod: CPTII,S$GLB,, | Performed by: INTERNAL MEDICINE

## 2024-04-03 NOTE — ASSESSMENT & PLAN NOTE
--Patient found to have PTH-mediated hypercalcemia  --Most consistent with primary hyperparathyroidism unmasked by HCTZ  --Reviewed with her that the HCTZ is likely making her calcium higher and it may come down to a more reasonable range if we are able to stop the thiazide  --Currently her only surgical indication for parathyroidectomy is serum calcium persistently >11   --Will discuss with PCP to see if we can stop or substitute HCTZ for a non-thiazide anti-hypertensive  --I would then like to repeat her labs in 12 weeks along with a 24 hr urine calcium  --Reviewed with her that if her calcium remains significantly elevated or if her 24 hr urine calcium is elevated off of HCTZ she would be a candidate for parathyroidectomy  --If calcium comes down significantly off of thiazide could monitor for now  --Reviewed role for localization studies if we decide to move forward with parathyroidectomy

## 2024-04-03 NOTE — PATIENT INSTRUCTIONS
Will let Malcom Vigil know my recommendation to stop HCTZ, will then plan to 24 hr urine and labs after you are off HCTZ for 12 weeks    If calcium still high off of HCTZ or urine calcium high you may need parathyroid surgery

## 2024-04-03 NOTE — Clinical Note
Thaddeus العراقي,   She's seeing you tomorrow. We discussed possibly stopping her HCTZ which will likely lower her calcium and could keep her from needing parathyroid surgery. I'm thinking she will need an alternate anti-hypertensive as her blood pressure was elevated at today's visit.   Thanks,  Devan

## 2024-04-03 NOTE — PROGRESS NOTES
"Subjective:      Patient ID: Delgado Espinoza is a 68 y.o. female.    Chief Complaint:  Hyperparathyroidism      History of Present Illness  Ms. Espinoza presents for evaluation and management of hyperparathyroidism.     Has active history of HTN.     Noted to have elevated PTH with elevated calcium since at least 2021.     Calcium, PTH, and Vit D levels:   Latest Reference Range & Units 12/21/21 10:12 06/14/22 09:15 12/20/22 09:23 08/17/23 12:43 09/14/23 14:23 02/19/24 00:00   Calcium 8.6 - 10.4 mg/dL 11.3 (H) 11.0 (H) 11.6 (H) 11.9 (H)  11.4 (H)   Albumin 3.6 - 5.1 g/dL 4.4 4.2 4.4 4.2  4.2   PTH 16 - 77 pg/mL     117 (H) 109 (H)       No history of fragility fracture.    Bone density in 2/2023 with osteopenia and low FRAX.     No history of nephrolithiasis. GFR >60.     Has never taken lithium. Has been on HCTZ for around 5 years. No excessive calcium intake. Not currently taking vitamin D supplements, stopped about 6 months ago.     Not currently taking a PPI and has not taken a PPI chronically.     No family history of hypercalcemia or hyperparathyroidism. No personal history of elevated serum calcium at a young age.     No depression/low mood, bone pain, abdominal pain or constipation.      Review of Systems   Constitutional:  Negative for chills and fever.   Gastrointestinal:  Negative for nausea.       Objective:   Physical Exam  Vitals and nursing note reviewed.       BP Readings from Last 3 Encounters:   04/03/24 (!) 156/98   02/22/24 (!) 130/90   08/22/23 132/84     Wt Readings from Last 1 Encounters:   04/03/24 0820 70.1 kg (154 lb 8.7 oz)       Body mass index is 24.94 kg/m².    Lab Review:   No results found for: "HGBA1C"  Lab Results   Component Value Date    CHOL 175 08/17/2023    HDL 62 08/17/2023    LDLCALC 100 (H) 08/17/2023    TRIG 52 08/17/2023    CHOLHDL 2.8 08/17/2023     Lab Results   Component Value Date     02/19/2024    K 4.7 02/19/2024     02/19/2024    CO2 28 02/19/2024    GLU " 90 02/19/2024    BUN 16 02/19/2024    CREATININE 0.79 02/19/2024    CALCIUM 11.4 (H) 02/19/2024    PROT 7.5 02/19/2024    ALBUMIN 4.2 02/19/2024    BILITOT 0.4 02/19/2024    ALKPHOS 88 05/14/2020    AST 19 02/19/2024    ALT 14 02/19/2024    ANIONGAP 11 03/23/2014    ESTGFRAFRICA 88 06/14/2022    EGFRNONAA 76 06/14/2022    TSH 3.42 08/17/2023         Assessment and Plan     Primary hyperparathyroidism  --Patient found to have PTH-mediated hypercalcemia  --Most consistent with primary hyperparathyroidism unmasked by HCTZ  --Reviewed with her that the HCTZ is likely making her calcium higher and it may come down to a more reasonable range if we are able to stop the thiazide  --Currently her only surgical indication for parathyroidectomy is serum calcium persistently >11   --Will discuss with PCP to see if we can stop or substitute HCTZ for a non-thiazide anti-hypertensive  --I would then like to repeat her labs in 12 weeks along with a 24 hr urine calcium  --Reviewed with her that if her calcium remains significantly elevated or if her 24 hr urine calcium is elevated off of HCTZ she would be a candidate for parathyroidectomy  --If calcium comes down significantly off of thiazide could monitor for now  --Reviewed role for localization studies if we decide to move forward with parathyroidectomy      Stop HCTZ and repeat labs and 24 hr urine for calcium and creatinine in 12 weeks      Devan Dowling M.D. Staff Endocrinology

## 2024-04-04 ENCOUNTER — OFFICE VISIT (OUTPATIENT)
Dept: FAMILY MEDICINE | Facility: CLINIC | Age: 69
End: 2024-04-04
Payer: MEDICARE

## 2024-04-04 VITALS
OXYGEN SATURATION: 97 % | BODY MASS INDEX: 24.91 KG/M2 | DIASTOLIC BLOOD PRESSURE: 86 MMHG | HEART RATE: 74 BPM | WEIGHT: 155 LBS | SYSTOLIC BLOOD PRESSURE: 134 MMHG | HEIGHT: 66 IN

## 2024-04-04 DIAGNOSIS — I10 ESSENTIAL HYPERTENSION: Primary | ICD-10-CM

## 2024-04-04 DIAGNOSIS — E83.52 SERUM CALCIUM ELEVATED: ICD-10-CM

## 2024-04-04 PROCEDURE — 1159F MED LIST DOCD IN RCRD: CPT | Mod: CPTII,S$GLB,, | Performed by: NURSE PRACTITIONER

## 2024-04-04 PROCEDURE — 1160F RVW MEDS BY RX/DR IN RCRD: CPT | Mod: CPTII,S$GLB,, | Performed by: NURSE PRACTITIONER

## 2024-04-04 PROCEDURE — 1101F PT FALLS ASSESS-DOCD LE1/YR: CPT | Mod: CPTII,S$GLB,, | Performed by: NURSE PRACTITIONER

## 2024-04-04 PROCEDURE — 3075F SYST BP GE 130 - 139MM HG: CPT | Mod: CPTII,S$GLB,, | Performed by: NURSE PRACTITIONER

## 2024-04-04 PROCEDURE — 3288F FALL RISK ASSESSMENT DOCD: CPT | Mod: CPTII,S$GLB,, | Performed by: NURSE PRACTITIONER

## 2024-04-04 PROCEDURE — 3008F BODY MASS INDEX DOCD: CPT | Mod: CPTII,S$GLB,, | Performed by: NURSE PRACTITIONER

## 2024-04-04 PROCEDURE — 3079F DIAST BP 80-89 MM HG: CPT | Mod: CPTII,S$GLB,, | Performed by: NURSE PRACTITIONER

## 2024-04-04 PROCEDURE — 99213 OFFICE O/P EST LOW 20 MIN: CPT | Mod: S$GLB,,, | Performed by: NURSE PRACTITIONER

## 2024-04-04 RX ORDER — AMLODIPINE BESYLATE 5 MG/1
5 TABLET ORAL DAILY
Qty: 90 TABLET | Refills: 0 | Status: SHIPPED | OUTPATIENT
Start: 2024-04-04 | End: 2024-06-04 | Stop reason: SDUPTHER

## 2024-04-04 RX ORDER — OLMESARTAN MEDOXOMIL 40 MG/1
40 TABLET ORAL DAILY
Qty: 90 TABLET | Refills: 0 | Status: SHIPPED | OUTPATIENT
Start: 2024-04-04 | End: 2024-06-04 | Stop reason: SDUPTHER

## 2024-04-04 NOTE — PROGRESS NOTES
SUBJECTIVE:      Patient ID: Delgado Espinoza is a 68 y.o. female.    Chief Complaint: Hypertension    Patient is here today to f/u on htn and review labs. She did see endo for elevated Ca and PTH. He advised stopping hctz and repeating labs. She is feeling good, no complaints     Hypertension  This is a chronic problem. The current episode started more than 1 year ago. The problem is unchanged. The problem is controlled. Pertinent negatives include no anxiety, chest pain, headaches, malaise/fatigue, orthopnea, palpitations, peripheral edema or shortness of breath. Past treatments include angiotensin blockers and diuretics. The current treatment provides moderate improvement.       Past Surgical History:   Procedure Laterality Date    AUGMENTATION OF BREAST      BREAST BIOPSY      BREAST CYST EXCISION      BREAST SURGERY      COLONOSCOPY N/A 01/11/2023    Procedure: COLONOSCOPY;  Surgeon: Sudhir James MD;  Location[facility];  Service: Endoscopy;  Laterality: N/A;    MYOMECTOMY      OOPHORECTOMY      RIGHT OOPHORECTOMY       Family History   Problem Relation Age of Onset    Stroke Mother     Breast cancer Neg Hx     Ovarian cancer Neg Hx     Glaucoma Neg Hx     Macular degeneration Neg Hx       Social History     Socioeconomic History    Marital status: Single   Tobacco Use    Smoking status: Never     Passive exposure: Never    Smokeless tobacco: Never   Substance and Sexual Activity    Alcohol use: Yes     Comment: rare    Drug use: No    Sexual activity: Not Currently     Current Outpatient Medications   Medication Sig Dispense Refill    aspirin 81 MG Chew Take 81 mg by mouth once daily.      fish oil-omega-3 fatty acids 300-1,000 mg capsule Take 2 g by mouth once daily.      fluticasone propionate (FLONASE) 50 mcg/actuation nasal spray 1 spray (50 mcg total) by Each Nostril route once daily. 9.9 mL 0    levocetirizine (XYZAL) 5 MG tablet Take 1 tablet (5 mg total) by mouth every evening. 30 tablet 1     multivitamin (THERAGRAN) per tablet Take 1 tablet by mouth once daily.      amLODIPine (NORVASC) 5 MG tablet Take 1 tablet (5 mg total) by mouth once daily. 90 tablet 0    olmesartan (BENICAR) 40 MG tablet Take 1 tablet (40 mg total) by mouth once daily. 90 tablet 0     No current facility-administered medications for this visit.     Review of patient's allergies indicates:  No Known Allergies   Past Medical History:   Diagnosis Date    Hypertension      Past Surgical History:   Procedure Laterality Date    AUGMENTATION OF BREAST      BREAST BIOPSY      BREAST CYST EXCISION      BREAST SURGERY      COLONOSCOPY N/A 01/11/2023    Procedure: COLONOSCOPY;  Surgeon: Sudhir James MD;  Location: [facility];  Service: Endoscopy;  Laterality: N/A;    MYOMECTOMY      OOPHORECTOMY      RIGHT OOPHORECTOMY         Review of Systems   Constitutional:  Negative for appetite change, chills, diaphoresis, malaise/fatigue and unexpected weight change.   HENT:  Negative for ear discharge, hearing loss, trouble swallowing and voice change.    Eyes:  Negative for photophobia and pain.   Respiratory:  Negative for chest tightness, shortness of breath and stridor.    Cardiovascular:  Negative for chest pain, palpitations and orthopnea.   Gastrointestinal:  Negative for abdominal pain, blood in stool and vomiting.   Endocrine: Negative for cold intolerance and heat intolerance.   Genitourinary:  Negative for difficulty urinating and flank pain.   Musculoskeletal:  Negative for joint swelling and neck stiffness.   Skin:  Negative for pallor.   Neurological:  Negative for dizziness, speech difficulty, weakness, light-headedness and headaches.   Hematological:  Does not bruise/bleed easily.   Psychiatric/Behavioral:  Negative for confusion, dysphoric mood, self-injury, sleep disturbance and suicidal ideas. The patient is not nervous/anxious.       OBJECTIVE:      Vitals:    04/04/24 1440 04/04/24 1457   BP: (!) (P) 140/82 134/86   Pulse:  "74    SpO2: 97%    Weight: 70.3 kg (155 lb)    Height: 5' 6" (1.676 m)      Physical Exam  Vitals and nursing note reviewed.   Constitutional:       General: She is not in acute distress.     Appearance: She is well-developed.   HENT:      Head: Normocephalic and atraumatic.      Right Ear: Tympanic membrane normal.      Left Ear: Tympanic membrane normal.      Nose: Nose normal.      Mouth/Throat:      Pharynx: Uvula midline.   Eyes:      General: Lids are normal.      Conjunctiva/sclera: Conjunctivae normal.      Pupils: Pupils are equal, round, and reactive to light.      Right eye: Pupil is round and reactive.      Left eye: Pupil is round and reactive.   Neck:      Thyroid: No thyromegaly.      Vascular: No JVD.      Trachea: Trachea normal.   Cardiovascular:      Rate and Rhythm: Normal rate and regular rhythm.      Pulses: Normal pulses.      Heart sounds: Normal heart sounds. No murmur heard.  Pulmonary:      Effort: Pulmonary effort is normal. No tachypnea or respiratory distress.      Breath sounds: Normal breath sounds. No wheezing, rhonchi or rales.   Abdominal:      General: Bowel sounds are normal.      Palpations: Abdomen is soft.      Tenderness: There is no abdominal tenderness.   Musculoskeletal:         General: Normal range of motion.      Cervical back: Normal range of motion and neck supple.      Right lower leg: No edema.      Left lower leg: No edema.   Lymphadenopathy:      Cervical: No cervical adenopathy.   Skin:     General: Skin is warm and dry.      Findings: No rash.   Neurological:      Mental Status: She is alert and oriented to person, place, and time.   Psychiatric:         Mood and Affect: Mood normal.         Speech: Speech normal.         Behavior: Behavior normal. Behavior is cooperative.         Thought Content: Thought content normal.         Judgment: Judgment normal.        Last visit note, most recent available labs, and health maintenance reviewed    No visits with " results within 1 Month(s) from this visit.   Latest known visit with results is:   Telephone on 02/05/2024   Component Date Value Ref Range Status    Glucose 02/19/2024 90  65 - 99 mg/dL Final    Comment:               Fasting reference interval         BUN 02/19/2024 16  7 - 25 mg/dL Final    Creatinine 02/19/2024 0.79  0.50 - 1.05 mg/dL Final    eGFR 02/19/2024 81  > OR = 60 mL/min/1.73m2 Final    BUN/Creatinine Ratio 02/19/2024 SEE NOTE:  6 - 22 (calc) Final    Comment:    Not Reported: BUN and Creatinine are within     reference range.            Sodium 02/19/2024 141  135 - 146 mmol/L Final    Potassium 02/19/2024 4.7  3.5 - 5.3 mmol/L Final    Chloride 02/19/2024 104  98 - 110 mmol/L Final    CO2 02/19/2024 28  20 - 32 mmol/L Final    Calcium 02/19/2024 11.4 (H)  8.6 - 10.4 mg/dL Final    Total Protein 02/19/2024 7.5  6.1 - 8.1 g/dL Final    Albumin 02/19/2024 4.2  3.6 - 5.1 g/dL Final    Globulin, Total 02/19/2024 3.3  1.9 - 3.7 g/dL (calc) Final    Albumin/Globulin Ratio 02/19/2024 1.3  1.0 - 2.5 (calc) Final    Total Bilirubin 02/19/2024 0.4  0.2 - 1.2 mg/dL Final    Alkaline Phosphatase 02/19/2024 97  37 - 153 U/L Final    AST 02/19/2024 19  10 - 35 U/L Final    ALT 02/19/2024 14  6 - 29 U/L Final    PTH, Intact 02/19/2024 109 (H)  16 - 77 pg/mL Final    Comment:    Interpretive Guide    Intact PTH           Calcium  ------------------    ----------           -------  Normal Parathyroid    Normal               Normal  Hypoparathyroidism    Low or Low Normal    Low  Hyperparathyroidism     Primary            Normal or High       High     Secondary          High                 Normal or Low     Tertiary           High                 High  Non-Parathyroid     Hypercalcemia      Low or Low Normal    High         Ionized Calcium 02/19/2024 5.9 (H)  4.7 - 5.5 mg/dL Final    WBC 02/19/2024 5.9  3.8 - 10.8 Thousand/uL Final    RBC 02/19/2024 5.06  3.80 - 5.10 Million/uL Final    Hemoglobin 02/19/2024 13.0  11.7  - 15.5 g/dL Final    Hematocrit 02/19/2024 40.7  35.0 - 45.0 % Final    MCV 02/19/2024 80.4  80.0 - 100.0 fL Final    MCH 02/19/2024 25.7 (L)  27.0 - 33.0 pg Final    MCHC 02/19/2024 31.9 (L)  32.0 - 36.0 g/dL Final    RDW 02/19/2024 14.7  11.0 - 15.0 % Final    Platelets 02/19/2024 261  140 - 400 Thousand/uL Final    MPV 02/19/2024 12.1  7.5 - 12.5 fL Final    Neutrophils, Abs 02/19/2024 2,685  1,500 - 7,800 cells/uL Final    Lymph # 02/19/2024 2,177  850 - 3,900 cells/uL Final    Mono # 02/19/2024 472  200 - 950 cells/uL Final    Eos # 02/19/2024 513 (H)  15 - 500 cells/uL Final    Baso # 02/19/2024 53  0 - 200 cells/uL Final    Neutrophils Relative 02/19/2024 45.5  % Final    Lymph % 02/19/2024 36.9  % Final    Mono % 02/19/2024 8.0  % Final    Eosinophil % 02/19/2024 8.7  % Final    Basophil % 02/19/2024 0.9  % Final    Vitamin D, 25-OH, Total 02/19/2024 37  30 - 100 ng/mL Final    Comment: Vitamin D Status         25-OH Vitamin D:     Deficiency:                    <20 ng/mL  Insufficiency:             20 - 29 ng/mL  Optimal:                 > or = 30 ng/mL     For 25-OH Vitamin D testing on patients on   D2-supplementation and patients for whom quantitation   of D2 and D3 fractions is required, the QuestAssureD(TM)  25-OH VIT D, (D2,D3), LC/MS/MS is recommended: order   code 04669 (patients >2yrs).     See Note 1     Note 1     For additional information, please refer to   http://education.[employer]/faq/ODY827   (This link is being provided for informational/  educational purposes only.)     ]  Assessment:       1. Essential hypertension    2. Serum calcium elevated        Plan:       Essential hypertension  -     Lipid Panel; Future; Expected date: 04/18/2024  -     olmesartan (BENICAR) 40 MG tablet; Take 1 tablet (40 mg total) by mouth once daily.  Dispense: 90 tablet; Refill: 0  -     amLODIPine (NORVASC) 5 MG tablet; Take 1 tablet (5 mg total) by mouth once daily.  Dispense: 90 tablet; Refill:  0    Serum calcium elevated  Hctz stopped  Labs as ordered and keep f/u with endo    Follow up in about 2 months (around 6/4/2024) for htn.      4/4/2024 KENNEY Castillo, FNP

## 2024-04-18 ENCOUNTER — OFFICE VISIT (OUTPATIENT)
Dept: OBSTETRICS AND GYNECOLOGY | Facility: CLINIC | Age: 69
End: 2024-04-18
Payer: MEDICARE

## 2024-04-18 VITALS
HEIGHT: 66 IN | DIASTOLIC BLOOD PRESSURE: 72 MMHG | SYSTOLIC BLOOD PRESSURE: 128 MMHG | BODY MASS INDEX: 25.05 KG/M2 | WEIGHT: 155.88 LBS

## 2024-04-18 DIAGNOSIS — Z12.4 CERVICAL CANCER SCREENING: ICD-10-CM

## 2024-04-18 DIAGNOSIS — Z01.419 ENCOUNTER FOR GYNECOLOGICAL EXAMINATION (GENERAL) (ROUTINE) WITHOUT ABNORMAL FINDINGS: Primary | ICD-10-CM

## 2024-04-18 PROCEDURE — 99999 PR PBB SHADOW E&M-EST. PATIENT-LVL III: CPT | Mod: PBBFAC,,, | Performed by: OBSTETRICS & GYNECOLOGY

## 2024-04-18 PROCEDURE — 1126F AMNT PAIN NOTED NONE PRSNT: CPT | Mod: CPTII,S$GLB,, | Performed by: OBSTETRICS & GYNECOLOGY

## 2024-04-18 PROCEDURE — 4010F ACE/ARB THERAPY RXD/TAKEN: CPT | Mod: CPTII,S$GLB,, | Performed by: OBSTETRICS & GYNECOLOGY

## 2024-04-18 PROCEDURE — 88175 CYTOPATH C/V AUTO FLUID REDO: CPT | Performed by: OBSTETRICS & GYNECOLOGY

## 2024-04-18 PROCEDURE — 3074F SYST BP LT 130 MM HG: CPT | Mod: CPTII,S$GLB,, | Performed by: OBSTETRICS & GYNECOLOGY

## 2024-04-18 PROCEDURE — 1159F MED LIST DOCD IN RCRD: CPT | Mod: CPTII,S$GLB,, | Performed by: OBSTETRICS & GYNECOLOGY

## 2024-04-18 PROCEDURE — 1101F PT FALLS ASSESS-DOCD LE1/YR: CPT | Mod: CPTII,S$GLB,, | Performed by: OBSTETRICS & GYNECOLOGY

## 2024-04-18 PROCEDURE — 3288F FALL RISK ASSESSMENT DOCD: CPT | Mod: CPTII,S$GLB,, | Performed by: OBSTETRICS & GYNECOLOGY

## 2024-04-18 PROCEDURE — G0101 CA SCREEN;PELVIC/BREAST EXAM: HCPCS | Mod: S$GLB,,, | Performed by: OBSTETRICS & GYNECOLOGY

## 2024-04-18 PROCEDURE — 3078F DIAST BP <80 MM HG: CPT | Mod: CPTII,S$GLB,, | Performed by: OBSTETRICS & GYNECOLOGY

## 2024-04-18 PROCEDURE — 87624 HPV HI-RISK TYP POOLED RSLT: CPT | Performed by: OBSTETRICS & GYNECOLOGY

## 2024-04-18 NOTE — PROGRESS NOTES
Chief Complaint   Patient presents with    Well Woman       History and Physical:  Delgado Espinoza is a 68 y.o. F who presents today for her routine annual GYN exam. The patient has no Gynecology complaints.    Annual:   Last Pap: 2023 NILM & HPV positive non 16/18; next 2024 due  History of abnormal pap:  HPV pos  Last Mammogram: 2024 BIRADS 1; Tyrer-Cuzick risk 2%; next 2024  Colonosocpy: 2023, repeat 3 years; next 2026  DEXA: 2023 Osteopenia; next 2026  PCP: Malcom Vigil NP orders routine labs 2024    Allergies: Review of patient's allergies indicates:  No Known Allergies  Past Medical History:   Diagnosis Date    Hypertension      Past Surgical History:   Procedure Laterality Date    AUGMENTATION OF BREAST      BREAST BIOPSY      BREAST CYST EXCISION      BREAST SURGERY      COLONOSCOPY N/A 2023    Procedure: COLONOSCOPY;  Surgeon: Sudhir James MD;  Location: [facility];  Service: Endoscopy;  Laterality: N/A;    MYOMECTOMY      OOPHORECTOMY      RIGHT OOPHORECTOMY       MEDS:   Current Outpatient Medications on File Prior to Visit   Medication Sig Dispense Refill    amLODIPine (NORVASC) 5 MG tablet Take 1 tablet (5 mg total) by mouth once daily. 90 tablet 0    aspirin 81 MG Chew Take 81 mg by mouth once daily.      fish oil-omega-3 fatty acids 300-1,000 mg capsule Take 2 g by mouth once daily.      fluticasone propionate (FLONASE) 50 mcg/actuation nasal spray 1 spray (50 mcg total) by Each Nostril route once daily. 9.9 mL 0    levocetirizine (XYZAL) 5 MG tablet Take 1 tablet (5 mg total) by mouth every evening. 30 tablet 1    multivitamin (THERAGRAN) per tablet Take 1 tablet by mouth once daily.      olmesartan (BENICAR) 40 MG tablet Take 1 tablet (40 mg total) by mouth once daily. 90 tablet 0     No current facility-administered medications on file prior to visit.     OB History          2    Para   2    Term   2            AB        Living             SAB         "IAB        Ectopic        Multiple        Live Births                   Social History     Socioeconomic History    Marital status: Single   Tobacco Use    Smoking status: Never     Passive exposure: Never    Smokeless tobacco: Never   Substance and Sexual Activity    Alcohol use: Yes     Comment: rare    Drug use: No    Sexual activity: Not Currently     Family History   Problem Relation Name Age of Onset    Stroke Mother      Breast cancer Neg Hx      Ovarian cancer Neg Hx      Glaucoma Neg Hx      Macular degeneration Neg Hx         Past medical and surgical history reviewed.   I have reviewed the patient's medical history in detail and updated the computerized patient record.    Review of System:   General: no chills, fever, night sweats, weight gain or weight loss  Breasts: no new or changing breast lumps, nipple discharge or masses.  Gastrointestinal: no abdominal pain, change in bowel habits, or black or bloody stools  Genito-Urinary: no incontinence, urinary frequency/urgency or vulvar/vaginal symptoms, pelvic pain or abnormal vaginal bleeding.    Physical Exam:   /72   Ht 5' 6" (1.676 m)   Wt 70.7 kg (155 lb 13.8 oz)   BMI 25.16 kg/m²   Body mass index is 25.16 kg/m².  Constitutional: She appears alert and responsive. She appears well-developed, well-groomed, and well-nourished. No distress.  Breasts: are symmetrical.  Right breast exhibits no inverted nipple, no mass, no nipple discharge, no skin change and no tenderness.  Left breast exhibits no inverted nipple, no mass, no nipple discharge, no skin change and no tenderness.  Abdominal: Soft. She exhibits no distension, hernias or masses. There is no tenderness. No enlargement of liver edge or spleen.  There is no rebound and no guarding.   Genitourinary:    External rectal exam shows no thrombosed external hemorrhoids, no lesions.     Pelvic exam was performed with patient supine.   No labial fusion, and symmetrical.    There is no rash, lesion " or injury on the right labia.   There is no rash, lesion or injury on the left labia.   No bleeding and no signs of injury around the vaginal introitus, urethral meatus is normal size and without prolapse or lesions, urethra well supported. The cervix is visualized with no discharge, lesions or friability.   No vaginal discharge found.    No significant Cystocele, Enterocele or rectocele, and cervix and uterus well supported.   Bimanual exam:   The urethra is normal to palpation and there are no palpable vaginal wall masses.   Uterus is not deviated, not enlarged, not fixed, normal shape and not tender.   Cervix exhibits no motion tenderness.    Right adnexum displays no mass or nodularity and no tenderness.   Left adnexum displays no mass or nodularity and no tenderness.    Assessment/Plan:   Encounter for gynecological examination (general) (routine) without abnormal findings    Cervical cancer screening          Follow up in 1 year.

## 2024-04-24 LAB
HPV HR 12 DNA SPEC QL NAA+PROBE: NEGATIVE
HPV16 AG SPEC QL: NEGATIVE
HPV18 DNA SPEC QL NAA+PROBE: NEGATIVE

## 2024-04-25 DIAGNOSIS — J30.2 SEASONAL ALLERGIC RHINITIS, UNSPECIFIED TRIGGER: ICD-10-CM

## 2024-04-25 LAB
FINAL PATHOLOGIC DIAGNOSIS: NORMAL
Lab: NORMAL

## 2024-04-26 RX ORDER — LEVOCETIRIZINE DIHYDROCHLORIDE 5 MG/1
5 TABLET, FILM COATED ORAL NIGHTLY
Qty: 90 TABLET | Refills: 1 | Status: SHIPPED | OUTPATIENT
Start: 2024-04-26

## 2024-06-03 NOTE — PROGRESS NOTES
SUBJECTIVE:      Patient ID: Delgado Espinoza is a 68 y.o. female.    Chief Complaint: Follow-up (No bottles//Pt is here for a check up and possible medication refills//JABIER )    Patient is here today to f/u on htn. She did see endo for elevated Ca and PTH. Hctz was stopped at her previous visit. She is feeling good, no complaints     Hypertension  This is a chronic problem. The current episode started more than 1 year ago. The problem is unchanged. The problem is controlled. Pertinent negatives include no anxiety, chest pain, headaches, malaise/fatigue, orthopnea, palpitations, peripheral edema or shortness of breath. Past treatments include angiotensin blockers and diuretics. The current treatment provides moderate improvement.       Past Surgical History:   Procedure Laterality Date    AUGMENTATION OF BREAST      BREAST BIOPSY      BREAST CYST EXCISION      BREAST SURGERY      COLONOSCOPY N/A 01/11/2023    Procedure: COLONOSCOPY;  Surgeon: Sudhir James MD;  Location: [facility];  Service: Endoscopy;  Laterality: N/A;    MYOMECTOMY      OOPHORECTOMY      RIGHT OOPHORECTOMY       Family History   Problem Relation Name Age of Onset    Stroke Mother      Breast cancer Neg Hx      Ovarian cancer Neg Hx      Glaucoma Neg Hx      Macular degeneration Neg Hx        Social History     Socioeconomic History    Marital status: Single   Tobacco Use    Smoking status: Never     Passive exposure: Never    Smokeless tobacco: Never   Substance and Sexual Activity    Alcohol use: Yes     Comment: rare    Drug use: No    Sexual activity: Not Currently     Current Outpatient Medications   Medication Sig Dispense Refill    amLODIPine (NORVASC) 5 MG tablet Take 1 tablet (5 mg total) by mouth once daily. 90 tablet 1    aspirin 81 MG Chew Take 81 mg by mouth once daily.      fish oil-omega-3 fatty acids 300-1,000 mg capsule Take 2 g by mouth once daily.      fluticasone propionate (FLONASE) 50 mcg/actuation nasal spray 1 spray  (50 mcg total) by Each Nostril route once daily. 9.9 mL 0    levocetirizine (XYZAL) 5 MG tablet TAKE 1 TABLET(5 MG) BY MOUTH EVERY EVENING 90 tablet 1    multivitamin (THERAGRAN) per tablet Take 1 tablet by mouth once daily.      olmesartan (BENICAR) 40 MG tablet Take 1 tablet (40 mg total) by mouth once daily. 90 tablet 1     No current facility-administered medications for this visit.     Review of patient's allergies indicates:  No Known Allergies   Past Medical History:   Diagnosis Date    Hypertension      Past Surgical History:   Procedure Laterality Date    AUGMENTATION OF BREAST      BREAST BIOPSY      BREAST CYST EXCISION      BREAST SURGERY      COLONOSCOPY N/A 01/11/2023    Procedure: COLONOSCOPY;  Surgeon: Sudhir James MD;  Location: [facility];  Service: Endoscopy;  Laterality: N/A;    MYOMECTOMY      OOPHORECTOMY      RIGHT OOPHORECTOMY         Review of Systems   Constitutional:  Negative for appetite change, chills, diaphoresis, malaise/fatigue and unexpected weight change.   HENT:  Negative for ear discharge, hearing loss, trouble swallowing and voice change.    Eyes:  Negative for photophobia and pain.   Respiratory:  Negative for chest tightness, shortness of breath and stridor.    Cardiovascular:  Negative for chest pain, palpitations and orthopnea.   Gastrointestinal:  Negative for abdominal pain, blood in stool and vomiting.   Endocrine: Negative for cold intolerance and heat intolerance.   Genitourinary:  Negative for difficulty urinating and flank pain.   Musculoskeletal:  Negative for joint swelling and neck stiffness.   Skin:  Negative for pallor.   Neurological:  Negative for dizziness, speech difficulty, weakness and headaches.   Hematological:  Does not bruise/bleed easily.   Psychiatric/Behavioral:  Negative for confusion, dysphoric mood, self-injury, sleep disturbance and suicidal ideas. The patient is not nervous/anxious.       OBJECTIVE:      Vitals:    06/04/24 1041   BP: 134/64  "  Pulse: 71   SpO2: 98%   Weight: 70.7 kg (155 lb 12.8 oz)   Height: 5' 6" (1.676 m)     Physical Exam  Vitals and nursing note reviewed.   Constitutional:       General: She is not in acute distress.     Appearance: She is well-developed.   HENT:      Head: Normocephalic and atraumatic.      Right Ear: Tympanic membrane normal.      Left Ear: Tympanic membrane normal.      Nose: Nose normal.      Mouth/Throat:      Pharynx: Uvula midline.   Eyes:      General: Lids are normal.      Conjunctiva/sclera: Conjunctivae normal.      Pupils: Pupils are equal, round, and reactive to light.      Right eye: Pupil is round and reactive.      Left eye: Pupil is round and reactive.   Neck:      Thyroid: No thyromegaly.      Vascular: No JVD.      Trachea: Trachea normal.   Cardiovascular:      Rate and Rhythm: Normal rate and regular rhythm.      Pulses: Normal pulses.      Heart sounds: Normal heart sounds. No murmur heard.  Pulmonary:      Effort: Pulmonary effort is normal. No tachypnea or respiratory distress.      Breath sounds: Normal breath sounds. No wheezing, rhonchi or rales.   Abdominal:      General: Bowel sounds are normal.      Palpations: Abdomen is soft.      Tenderness: There is no abdominal tenderness.   Musculoskeletal:         General: Normal range of motion.      Cervical back: Normal range of motion and neck supple.      Right lower leg: No edema.      Left lower leg: No edema.   Lymphadenopathy:      Cervical: No cervical adenopathy.   Skin:     General: Skin is warm and dry.      Findings: No rash.   Neurological:      Mental Status: She is alert and oriented to person, place, and time.   Psychiatric:         Mood and Affect: Mood normal.         Speech: Speech normal.         Behavior: Behavior normal. Behavior is cooperative.         Thought Content: Thought content normal.         Judgment: Judgment normal.            Last visit note, most recent available labs, and health maintenance " reviewed    Assessment:       1. Essential hypertension        Plan:       Essential hypertension  -     amLODIPine (NORVASC) 5 MG tablet; Take 1 tablet (5 mg total) by mouth once daily.  Dispense: 90 tablet; Refill: 1  -     olmesartan (BENICAR) 40 MG tablet; Take 1 tablet (40 mg total) by mouth once daily.  Dispense: 90 tablet; Refill: 1        Follow up in about 6 months (around 12/4/2024) for htn .      6/4/2024 KENNEY Castillo, FNP

## 2024-06-04 ENCOUNTER — OFFICE VISIT (OUTPATIENT)
Dept: FAMILY MEDICINE | Facility: CLINIC | Age: 69
End: 2024-06-04
Payer: MEDICARE

## 2024-06-04 VITALS
HEIGHT: 66 IN | OXYGEN SATURATION: 98 % | HEART RATE: 71 BPM | DIASTOLIC BLOOD PRESSURE: 64 MMHG | SYSTOLIC BLOOD PRESSURE: 134 MMHG | BODY MASS INDEX: 25.04 KG/M2 | WEIGHT: 155.81 LBS

## 2024-06-04 DIAGNOSIS — I10 ESSENTIAL HYPERTENSION: Primary | ICD-10-CM

## 2024-06-04 PROCEDURE — 1126F AMNT PAIN NOTED NONE PRSNT: CPT | Mod: CPTII,S$GLB,, | Performed by: NURSE PRACTITIONER

## 2024-06-04 PROCEDURE — 3008F BODY MASS INDEX DOCD: CPT | Mod: CPTII,S$GLB,, | Performed by: NURSE PRACTITIONER

## 2024-06-04 PROCEDURE — 1159F MED LIST DOCD IN RCRD: CPT | Mod: CPTII,S$GLB,, | Performed by: NURSE PRACTITIONER

## 2024-06-04 PROCEDURE — 1101F PT FALLS ASSESS-DOCD LE1/YR: CPT | Mod: CPTII,S$GLB,, | Performed by: NURSE PRACTITIONER

## 2024-06-04 PROCEDURE — 4010F ACE/ARB THERAPY RXD/TAKEN: CPT | Mod: CPTII,S$GLB,, | Performed by: NURSE PRACTITIONER

## 2024-06-04 PROCEDURE — 1160F RVW MEDS BY RX/DR IN RCRD: CPT | Mod: CPTII,S$GLB,, | Performed by: NURSE PRACTITIONER

## 2024-06-04 PROCEDURE — 3078F DIAST BP <80 MM HG: CPT | Mod: CPTII,S$GLB,, | Performed by: NURSE PRACTITIONER

## 2024-06-04 PROCEDURE — 3288F FALL RISK ASSESSMENT DOCD: CPT | Mod: CPTII,S$GLB,, | Performed by: NURSE PRACTITIONER

## 2024-06-04 PROCEDURE — 3075F SYST BP GE 130 - 139MM HG: CPT | Mod: CPTII,S$GLB,, | Performed by: NURSE PRACTITIONER

## 2024-06-04 PROCEDURE — 99213 OFFICE O/P EST LOW 20 MIN: CPT | Mod: S$GLB,,, | Performed by: NURSE PRACTITIONER

## 2024-06-04 RX ORDER — OLMESARTAN MEDOXOMIL 40 MG/1
40 TABLET ORAL DAILY
Qty: 90 TABLET | Refills: 1 | Status: SHIPPED | OUTPATIENT
Start: 2024-06-04

## 2024-06-04 RX ORDER — AMLODIPINE BESYLATE 5 MG/1
5 TABLET ORAL DAILY
Qty: 90 TABLET | Refills: 1 | Status: SHIPPED | OUTPATIENT
Start: 2024-06-04

## 2024-06-24 ENCOUNTER — PATIENT MESSAGE (OUTPATIENT)
Dept: FAMILY MEDICINE | Facility: CLINIC | Age: 69
End: 2024-06-24
Payer: MEDICARE

## 2024-06-25 ENCOUNTER — TELEPHONE (OUTPATIENT)
Dept: ENDOCRINOLOGY | Facility: CLINIC | Age: 69
End: 2024-06-25
Payer: MEDICARE

## 2024-06-25 DIAGNOSIS — E21.0 PRIMARY HYPERPARATHYROIDISM: Primary | ICD-10-CM

## 2024-06-25 NOTE — TELEPHONE ENCOUNTER
Tried to call patient and leave message Mail box was full    Please advise patient that I reviewed her labs and urine test. Her labs remains consistent with a diagnosis of primary hyperparathyroidism and her blood calcium level has remained elevated despite stopping the hydrochlorothiazide. I recommend we go ahead and do scans like we discussed to look for the enlarged parathyroid and she will need a referral to an endocrine surgeon to discuss possible parathyroid surgery after that.        `

## 2024-06-25 NOTE — TELEPHONE ENCOUNTER
Please advise patient that I reviewed her labs and urine test. Her labs remains consistent with a diagnosis of primary hyperparathyroidism and her blood calcium level has remained elevated despite stopping the hydrochlorothiazide. I recommend we go ahead and do scans like we discussed to look for the enlarged parathyroid and she will need a referral to an endocrine surgeon to discuss possible parathyroid surgery after that.

## 2024-07-28 DIAGNOSIS — J30.2 SEASONAL ALLERGIC RHINITIS, UNSPECIFIED TRIGGER: ICD-10-CM

## 2024-07-29 RX ORDER — LEVOCETIRIZINE DIHYDROCHLORIDE 5 MG/1
5 TABLET, FILM COATED ORAL NIGHTLY
Qty: 90 TABLET | Refills: 1 | Status: SHIPPED | OUTPATIENT
Start: 2024-07-29

## 2024-09-25 DIAGNOSIS — Z00.00 ENCOUNTER FOR MEDICARE ANNUAL WELLNESS EXAM: ICD-10-CM

## 2024-11-27 ENCOUNTER — TELEPHONE (OUTPATIENT)
Dept: FAMILY MEDICINE | Facility: CLINIC | Age: 69
End: 2024-11-27
Payer: MEDICARE

## 2024-11-27 DIAGNOSIS — Z00.00 PREVENTATIVE HEALTH CARE: ICD-10-CM

## 2024-11-27 DIAGNOSIS — Z79.899 ENCOUNTER FOR LONG-TERM (CURRENT) USE OF MEDICATIONS: Primary | ICD-10-CM

## 2024-11-27 DIAGNOSIS — R79.89 ABNORMAL TSH: ICD-10-CM

## 2024-11-27 DIAGNOSIS — I10 ESSENTIAL HYPERTENSION: ICD-10-CM

## 2024-11-27 NOTE — TELEPHONE ENCOUNTER
----- Message from Ella sent at 11/27/2024 12:57 PM CST -----  - 12:41- pt is calling back   [phone]

## 2024-11-27 NOTE — TELEPHONE ENCOUNTER
----- Message from Ella sent at 11/27/2024 12:20 PM CST -----  - 11:55- pt would like her labs sent to [facility] before her 12/4 appt   [phone]

## 2024-12-03 NOTE — PROGRESS NOTES
SUBJECTIVE:      Patient ID: Delgado Espinoza is a 68 y.o. female.    Chief Complaint: Hypertension (HTN follow up, home readings have been good. No complaints)    HPI  History of Present Illness    CHIEF COMPLAINT:  Delgado presents today for follow-up regarding high calcium levels.    HYPERCALCEMIA:  She reports elevated calcium levels that have persisted despite discontinuation of hydrochlorothiazide. She confirms she is not currently taking hydrochlorothiazide. She is to f/u with endo and she has outstanding orders for scans       MEDICATIONS:  She reports taking amlodipine and olmesartan for blood pressure management.          Past Surgical History:   Procedure Laterality Date    AUGMENTATION OF BREAST      BREAST BIOPSY      BREAST CYST EXCISION      BREAST SURGERY      COLONOSCOPY N/A 01/11/2023    Procedure: COLONOSCOPY;  Surgeon: Sudhir James MD;  Location: [facility];  Service: Endoscopy;  Laterality: N/A;    MYOMECTOMY      OOPHORECTOMY      RIGHT OOPHORECTOMY       Family History   Problem Relation Name Age of Onset    Stroke Mother      Breast cancer Neg Hx      Ovarian cancer Neg Hx      Glaucoma Neg Hx      Macular degeneration Neg Hx        Social History     Socioeconomic History    Marital status: Single   Tobacco Use    Smoking status: Never     Passive exposure: Never    Smokeless tobacco: Never   Substance and Sexual Activity    Alcohol use: Yes     Comment: rare    Drug use: No    Sexual activity: Not Currently     Current Outpatient Medications   Medication Sig Dispense Refill    aspirin 81 MG Chew Take 81 mg by mouth once daily.      fish oil-omega-3 fatty acids 300-1,000 mg capsule Take 2 g by mouth once daily.      multivitamin (THERAGRAN) per tablet Take 1 tablet by mouth once daily.      amLODIPine (NORVASC) 5 MG tablet Take 1 tablet (5 mg total) by mouth once daily. 90 tablet 1    fluticasone propionate (FLONASE) 50 mcg/actuation nasal spray 1 spray (50 mcg total) by Each  "Nostril route once daily. 9.9 mL 0    levocetirizine (XYZAL) 5 MG tablet TAKE 1 TABLET(5 MG) BY MOUTH EVERY EVENING 90 tablet 1    olmesartan (BENICAR) 40 MG tablet Take 1 tablet (40 mg total) by mouth once daily. 90 tablet 1     No current facility-administered medications for this visit.     Review of patient's allergies indicates:  No Known Allergies   Past Medical History:   Diagnosis Date    Hypertension      Past Surgical History:   Procedure Laterality Date    AUGMENTATION OF BREAST      BREAST BIOPSY      BREAST CYST EXCISION      BREAST SURGERY      COLONOSCOPY N/A 01/11/2023    Procedure: COLONOSCOPY;  Surgeon: Sudhir James MD;  Location: [facility];  Service: Endoscopy;  Laterality: N/A;    MYOMECTOMY      OOPHORECTOMY      RIGHT OOPHORECTOMY         Review of Systems   Constitutional:  Negative for appetite change, chills, diaphoresis and unexpected weight change.   HENT:  Negative for ear discharge, hearing loss, trouble swallowing and voice change.    Eyes:  Negative for photophobia and pain.   Respiratory:  Negative for chest tightness, shortness of breath, wheezing and stridor.    Cardiovascular:  Negative for chest pain and palpitations.   Gastrointestinal:  Negative for abdominal pain, blood in stool and vomiting.   Endocrine: Negative for cold intolerance and heat intolerance.   Genitourinary:  Negative for difficulty urinating and flank pain.   Musculoskeletal:  Negative for joint swelling and neck stiffness.   Skin:  Negative for pallor.   Neurological:  Negative for dizziness, speech difficulty, weakness, light-headedness and headaches.   Hematological:  Does not bruise/bleed easily.   Psychiatric/Behavioral:  Negative for confusion, dysphoric mood, self-injury, sleep disturbance and suicidal ideas. The patient is not nervous/anxious.       OBJECTIVE:      Vitals:    12/04/24 1043   BP: 132/84   Pulse: 79   SpO2: 100%   Weight: 71.7 kg (158 lb)   Height: 5' 6" (1.676 m)     Physical " Exam  Vitals and nursing note reviewed.   Constitutional:       General: She is not in acute distress.     Appearance: She is well-developed.   HENT:      Head: Normocephalic and atraumatic.      Right Ear: Tympanic membrane normal.      Left Ear: Tympanic membrane normal.      Nose: Nose normal.      Mouth/Throat:      Pharynx: Uvula midline.   Eyes:      General: Lids are normal.      Conjunctiva/sclera: Conjunctivae normal.      Pupils: Pupils are equal, round, and reactive to light.      Right eye: Pupil is round and reactive.      Left eye: Pupil is round and reactive.   Neck:      Thyroid: No thyromegaly.      Vascular: No JVD.      Trachea: Trachea normal.   Cardiovascular:      Rate and Rhythm: Normal rate and regular rhythm.      Pulses: Normal pulses.      Heart sounds: Normal heart sounds. No murmur heard.  Pulmonary:      Effort: Pulmonary effort is normal. No tachypnea or respiratory distress.      Breath sounds: Normal breath sounds. No wheezing, rhonchi or rales.   Abdominal:      General: Bowel sounds are normal.      Palpations: Abdomen is soft.      Tenderness: There is no abdominal tenderness.   Musculoskeletal:         General: Normal range of motion.      Cervical back: Normal range of motion and neck supple.      Right lower leg: No edema.      Left lower leg: No edema.   Lymphadenopathy:      Cervical: No cervical adenopathy.   Skin:     General: Skin is warm and dry.      Findings: No rash.   Neurological:      Mental Status: She is alert and oriented to person, place, and time.   Psychiatric:         Mood and Affect: Mood normal.         Speech: Speech normal.         Behavior: Behavior normal. Behavior is cooperative.         Thought Content: Thought content normal.         Judgment: Judgment normal.       Telephone on 11/27/2024   Component Date Value Ref Range Status    Color, UA 12/02/2024 YELLOW  YELLOW Final    Appearance, UA 12/02/2024 CLEAR  CLEAR Final    Specific [place], UA  12/02/2024 1.015  1.001 - 1.035 Final    pH, UA 12/02/2024 7.5  5.0 - 8.0 Final    Glucose, UA 12/02/2024 NEGATIVE  NEGATIVE Final    Bilirubin, UA 12/02/2024 NEGATIVE  NEGATIVE Final    Ketones, UA 12/02/2024 NEGATIVE  NEGATIVE Final    Occult Blood UA 12/02/2024 NEGATIVE  NEGATIVE Final    Protein, UA 12/02/2024 NEGATIVE  NEGATIVE Final    Nitrite, UA 12/02/2024 NEGATIVE  NEGATIVE Final    Leukocytes, UA 12/02/2024 NEGATIVE  NEGATIVE Final    WBC Casts, UA 12/02/2024 NONE SEEN  < OR = 5 /HPF Final    RBC Casts, UA 12/02/2024 NONE SEEN  < OR = 2 /HPF Final    Squam Epithel, UA 12/02/2024 NONE SEEN  < OR = 5 /HPF Final    Bacteria, UA 12/02/2024 NONE SEEN  NONE SEEN /HPF Final    Hyaline Casts, UA 12/02/2024 NONE SEEN  NONE SEEN /LPF Final    Service Cmt: 12/02/2024 SEE COMMENT   Final    Comment: This urine was analyzed for the presence of WBC,   RBC, bacteria, casts, and other formed elements.   Only those elements seen were reported.            Reflexive Urine Culture 12/02/2024 SEE COMMENT   Final    NO CULTURE INDICATED    TSH w/reflex to FT4 12/02/2024 4.41  0.40 - 4.50 mIU/L Final    Cholesterol 12/02/2024 197  <200 mg/dL Final    HDL 12/02/2024 68  > OR = 50 mg/dL Final    Triglycerides 12/02/2024 62  <150 mg/dL Final    LDL Cholesterol 12/02/2024 115 (H)  mg/dL (calc) Final    Comment: Reference range: <100     Desirable range <100 mg/dL for primary prevention;    <70 mg/dL for patients with CHD or diabetic patients   with > or = 2 CHD risk factors.     LDL-C is now calculated using the Luis Armando-Lyudmila   calculation, which is a validated novel method providing   better accuracy than the Friedewald equation in the   estimation of LDL-C.   Luis Armando BRENNER et al. GUI. 2013;310(19): 5822-0287   (http://education.[employer]/faq/LHS391)      HDL/Cholesterol Ratio 12/02/2024 2.9  <5.0 (calc) Final    Non HDL Chol. (LDL+VLDL) 12/02/2024 129  <130 mg/dL (calc) Final    Comment: For patients with diabetes plus 1  major ASCVD risk   factor, treating to a non-HDL-C goal of <100 mg/dL   (LDL-C of <70 mg/dL) is considered a therapeutic   option.      WBC 12/02/2024 6.3  3.8 - 10.8 Thousand/uL Final    RBC 12/02/2024 5.94 (H)  3.80 - 5.10 Million/uL Final    Hemoglobin 12/02/2024 14.9  11.7 - 15.5 g/dL Final    Hematocrit 12/02/2024 48.1 (H)  35.0 - 45.0 % Final    MCV 12/02/2024 81.0  80.0 - 100.0 fL Final    MCH 12/02/2024 25.1 (L)  27.0 - 33.0 pg Final    MCHC 12/02/2024 31.0 (L)  32.0 - 36.0 g/dL Final    Comment: For adults, a slight decrease in the calculated MCHC  value (in the range of 30 to 32 g/dL) is most likely  not clinically significant; however, it should be  interpreted with caution in correlation with other  red cell parameters and the patient's clinical  condition.      RDW 12/02/2024 15.7 (H)  11.0 - 15.0 % Final    Platelets 12/02/2024 273  140 - 400 Thousand/uL Final    MPV 12/02/2024 11.1  7.5 - 12.5 fL Final    Neutrophils, Abs 12/02/2024 3,497  1,500 - 7,800 cells/uL Final    Lymph # 12/02/2024 1,840  850 - 3,900 cells/uL Final    Mono # 12/02/2024 428  200 - 950 cells/uL Final    Eos # 12/02/2024 498  15 - 500 cells/uL Final    Baso # 12/02/2024 38  0 - 200 cells/uL Final    Neutrophils Relative 12/02/2024 55.5  % Final    Lymph % 12/02/2024 29.2  % Final    Mono % 12/02/2024 6.8  % Final    Eosinophil % 12/02/2024 7.9  % Final    Basophil % 12/02/2024 0.6  % Final    Glucose 12/02/2024 91  65 - 99 mg/dL Final    Comment:               Fasting reference interval         BUN 12/02/2024 17  7 - 25 mg/dL Final    Creatinine 12/02/2024 0.88  0.50 - 1.05 mg/dL Final    eGFR 12/02/2024 71  > OR = 60 mL/min/1.73m2 Final    BUN/Creatinine Ratio 12/02/2024 SEE NOTE:  6 - 22 (calc) Final    Comment:    Not Reported: BUN and Creatinine are within     reference range.            Sodium 12/02/2024 141  135 - 146 mmol/L Final    Potassium 12/02/2024 4.3  3.5 - 5.3 mmol/L Final    Chloride 12/02/2024 103  98 - 110  mmol/L Final    CO2 12/02/2024 31  20 - 32 mmol/L Final    Calcium 12/02/2024 12.2 (H)  8.6 - 10.4 mg/dL Final    Total Protein 12/02/2024 8.4 (H)  6.1 - 8.1 g/dL Final    Albumin 12/02/2024 4.8  3.6 - 5.1 g/dL Final    Globulin, Total 12/02/2024 3.6  1.9 - 3.7 g/dL (calc) Final    Albumin/Globulin Ratio 12/02/2024 1.3  1.0 - 2.5 (calc) Final    Total Bilirubin 12/02/2024 0.6  0.2 - 1.2 mg/dL Final    Alkaline Phosphatase 12/02/2024 154 (H)  37 - 153 U/L Final    AST 12/02/2024 26  10 - 35 U/L Final    ALT 12/02/2024 20  6 - 29 U/L Final        Last visit note, most recent available labs, and health maintenance reviewed    Assessment:       1. Essential hypertension    2. Screening mammogram, encounter for        Plan:       Essential hypertension  -     amLODIPine (NORVASC) 5 MG tablet; Take 1 tablet (5 mg total) by mouth once daily.  Dispense: 90 tablet; Refill: 1  -     olmesartan (BENICAR) 40 MG tablet; Take 1 tablet (40 mg total) by mouth once daily.  Dispense: 90 tablet; Refill: 1    Serum calcium elevated  Discussed follow up with endo and she is to have scans previously ordered    Screening mammogram, encounter for  -     Mammo Digital Screening Bilat w/ [facility]; Future; Expected date: 01/31/2025    Seasonal Allergic rhinitis  stable    Assessment & Plan    HYPERCALCEMIA AND PRIMARY HYPERPARATHYROIDISM:  - Reviewed recent lab work, noting elevated calcium levels.  - Reviewed previous recommendation from Dr. Dowling for scans to investigate potential primary hyperparathyroidism.  - Explained parathyroid gland location and function in relation to calcium production.  - Follow up with Dr. Dowling's office regarding elevated calcium levels and next steps.  - Contact [facility]'s scheduling line to arrange scans ordered by Dr. Dowling.    ESSENTIAL HYPERTENSION:  - Continued amlodipine at current dose.  - Continued olmesartan at current dose.    HYPERLIPIDEMIA:  - Noted cholesterol levels slightly elevated but not of  significant concern.      SCREENING MAMMOGRAM:  - Future order for mammogram in late January (to be scheduled when insurance will cover).  - Can schedule mammogram when arranging other scans or wait for call to schedule in January.    FOLLOW-UP:  - Follow up in June of next year.         Follow up in about 6 months (around 6/4/2025) for htn .  This note was generated with the assistance of ambient listening technology. Verbal consent was obtained by the patient and accompanying visitor(s) for the recording of patient appointment to facilitate this note. I attest to having reviewed and edited the generated note for accuracy, though some syntax or spelling errors may persist. Please contact the author of this note for any clarification.        12/4/2024 KENNEY Castillo, FNP

## 2024-12-04 ENCOUNTER — OFFICE VISIT (OUTPATIENT)
Dept: FAMILY MEDICINE | Facility: CLINIC | Age: 69
End: 2024-12-04
Payer: MEDICARE

## 2024-12-04 VITALS
BODY MASS INDEX: 25.39 KG/M2 | OXYGEN SATURATION: 100 % | HEART RATE: 79 BPM | SYSTOLIC BLOOD PRESSURE: 132 MMHG | HEIGHT: 66 IN | DIASTOLIC BLOOD PRESSURE: 84 MMHG | WEIGHT: 158 LBS

## 2024-12-04 DIAGNOSIS — I10 ESSENTIAL HYPERTENSION: Primary | ICD-10-CM

## 2024-12-04 DIAGNOSIS — J30.2 SEASONAL ALLERGIC RHINITIS, UNSPECIFIED TRIGGER: ICD-10-CM

## 2024-12-04 DIAGNOSIS — Z12.31 SCREENING MAMMOGRAM, ENCOUNTER FOR: ICD-10-CM

## 2024-12-04 PROCEDURE — 4010F ACE/ARB THERAPY RXD/TAKEN: CPT | Mod: CPTII,S$GLB,, | Performed by: NURSE PRACTITIONER

## 2024-12-04 PROCEDURE — 3008F BODY MASS INDEX DOCD: CPT | Mod: CPTII,S$GLB,, | Performed by: NURSE PRACTITIONER

## 2024-12-04 PROCEDURE — 3079F DIAST BP 80-89 MM HG: CPT | Mod: CPTII,S$GLB,, | Performed by: NURSE PRACTITIONER

## 2024-12-04 PROCEDURE — 3288F FALL RISK ASSESSMENT DOCD: CPT | Mod: CPTII,S$GLB,, | Performed by: NURSE PRACTITIONER

## 2024-12-04 PROCEDURE — 3075F SYST BP GE 130 - 139MM HG: CPT | Mod: CPTII,S$GLB,, | Performed by: NURSE PRACTITIONER

## 2024-12-04 PROCEDURE — 1101F PT FALLS ASSESS-DOCD LE1/YR: CPT | Mod: CPTII,S$GLB,, | Performed by: NURSE PRACTITIONER

## 2024-12-04 PROCEDURE — 99214 OFFICE O/P EST MOD 30 MIN: CPT | Mod: S$GLB,,, | Performed by: NURSE PRACTITIONER

## 2024-12-04 PROCEDURE — 1159F MED LIST DOCD IN RCRD: CPT | Mod: CPTII,S$GLB,, | Performed by: NURSE PRACTITIONER

## 2024-12-04 PROCEDURE — 1160F RVW MEDS BY RX/DR IN RCRD: CPT | Mod: CPTII,S$GLB,, | Performed by: NURSE PRACTITIONER

## 2024-12-04 RX ORDER — AMLODIPINE BESYLATE 5 MG/1
5 TABLET ORAL DAILY
Qty: 90 TABLET | Refills: 1 | Status: SHIPPED | OUTPATIENT
Start: 2024-12-04

## 2024-12-04 RX ORDER — OLMESARTAN MEDOXOMIL 40 MG/1
40 TABLET ORAL DAILY
Qty: 90 TABLET | Refills: 1 | Status: SHIPPED | OUTPATIENT
Start: 2024-12-04

## 2024-12-18 ENCOUNTER — TELEPHONE (OUTPATIENT)
Dept: ENDOCRINOLOGY | Facility: CLINIC | Age: 69
End: 2024-12-18
Payer: MEDICARE

## 2024-12-18 DIAGNOSIS — E21.0 PRIMARY HYPERPARATHYROIDISM: Primary | ICD-10-CM

## 2024-12-18 NOTE — TELEPHONE ENCOUNTER
----- Message from Niya sent at 12/18/2024 10:31 AM CST -----  Regarding: Scheduling  Good morning,     I received a call from the phone room. Patient would like to schedule her U/S and NM orders. I informed phone room that I can not schedule. Please contact patient to schedule her tests.     Thank you

## 2024-12-18 NOTE — TELEPHONE ENCOUNTER
I called pt and got her thyroid ultrasound scheduled. Also gave her the number to NM so she can get parathyroid scan scheduled.

## 2025-01-14 ENCOUNTER — TELEPHONE (OUTPATIENT)
Dept: SURGERY | Facility: CLINIC | Age: 70
End: 2025-01-14
Payer: MEDICARE

## 2025-01-17 ENCOUNTER — HOSPITAL ENCOUNTER (OUTPATIENT)
Dept: ENDOCRINOLOGY | Facility: CLINIC | Age: 70
Discharge: HOME | End: 2025-01-17
Attending: INTERNAL MEDICINE
Payer: MEDICARE

## 2025-01-17 ENCOUNTER — PATIENT MESSAGE (OUTPATIENT)
Dept: ENDOCRINOLOGY | Facility: CLINIC | Age: 70
End: 2025-01-17
Payer: MEDICARE

## 2025-01-17 DIAGNOSIS — E21.0 PRIMARY HYPERPARATHYROIDISM: ICD-10-CM

## 2025-01-17 PROCEDURE — 76536 US EXAM OF HEAD AND NECK: CPT | Mod: S$GLB,,, | Performed by: INTERNAL MEDICINE

## 2025-01-27 ENCOUNTER — TELEPHONE (OUTPATIENT)
Dept: FAMILY MEDICINE | Facility: CLINIC | Age: 70
End: 2025-01-27
Payer: MEDICARE

## 2025-01-27 ENCOUNTER — TELEPHONE (OUTPATIENT)
Dept: ENDOCRINOLOGY | Facility: CLINIC | Age: 70
End: 2025-01-27
Payer: MEDICARE

## 2025-01-27 NOTE — TELEPHONE ENCOUNTER
Spoke with patient states she needs a new order for CT parathyroid scan and would like it order so that she can schedule. Thank you        ----- Message from Rosa sent at 1/27/2025 11:30 AM CST -----  Regarding: call back  Type: Patient Call Back    Who called: pt     What is the request in detail: requesting new orders for parathyroid scans and a call to schedule    Can the clinic reply by [facility]?no    Would the patient rather a call back or a response via [facility]? call    Best call back number: [phone]     Additional Information:

## 2025-01-27 NOTE — TELEPHONE ENCOUNTER
[facility] requesting addtional code for lab Added E21.0 to billing trailer. Looks like this should have been covered with codes used

## 2025-02-14 ENCOUNTER — HOSPITAL ENCOUNTER (OUTPATIENT)
Dept: RADIOLOGY | Facility: HOSPITAL | Age: 70
Discharge: HOME | End: 2025-02-14
Attending: INTERNAL MEDICINE
Payer: MEDICARE

## 2025-02-14 DIAGNOSIS — E21.0 PRIMARY HYPERPARATHYROIDISM: ICD-10-CM

## 2025-02-14 PROCEDURE — 78072 PARATHYRD PLANAR W/SPECT&CT: CPT | Mod: TC

## 2025-02-14 PROCEDURE — 78072 PARATHYRD PLANAR W/SPECT&CT: CPT | Mod: 26,,, | Performed by: NUCLEAR MEDICINE

## 2025-02-14 PROCEDURE — A9500 TC99M SESTAMIBI: HCPCS | Performed by: INTERNAL MEDICINE

## 2025-02-14 RX ORDER — TETRAKIS(2-METHOXYISOBUTYLISOCYANIDE)COPPER(I) TETRAFLUOROBORATE 1 MG/ML
20 INJECTION, POWDER, LYOPHILIZED, FOR SOLUTION INTRAVENOUS
Status: COMPLETED | OUTPATIENT
Start: 2025-02-14 | End: 2025-02-14

## 2025-02-14 RX ADMIN — KIT FOR THE PREPARATION OF TECHNETIUM TC99M SESTAMIBI 22.3 MILLICURIE: 1 INJECTION, POWDER, LYOPHILIZED, FOR SOLUTION PARENTERAL at 09:02

## 2025-02-18 ENCOUNTER — PATIENT MESSAGE (OUTPATIENT)
Dept: ENDOCRINOLOGY | Facility: CLINIC | Age: 70
End: 2025-02-18
Payer: MEDICARE

## 2025-02-18 DIAGNOSIS — E21.0 PRIMARY HYPERPARATHYROIDISM: Primary | ICD-10-CM

## 2025-02-18 NOTE — TELEPHONE ENCOUNTER
Devan Dowling MD to [facility] Endocrine Surgery Scheduling Staff (Selected Message)        2/18/25  4:13 PM  Needs referral to endocrine surgery for parathyroidectomy. Thank you.

## 2025-02-21 ENCOUNTER — HOSPITAL ENCOUNTER (OUTPATIENT)
Dept: RADIOLOGY | Facility: HOSPITAL | Age: 70
Discharge: HOME | End: 2025-02-21
Attending: NURSE PRACTITIONER
Payer: MEDICARE

## 2025-02-21 DIAGNOSIS — Z12.31 SCREENING MAMMOGRAM, ENCOUNTER FOR: ICD-10-CM

## 2025-02-21 PROCEDURE — 77067 SCR MAMMO BI INCL CAD: CPT | Mod: 26,,, | Performed by: RADIOLOGY

## 2025-02-21 PROCEDURE — 77063 BREAST TOMOSYNTHESIS BI: CPT | Mod: TC

## 2025-02-21 PROCEDURE — 77063 BREAST TOMOSYNTHESIS BI: CPT | Mod: 26,,, | Performed by: RADIOLOGY

## 2025-02-23 ENCOUNTER — RESULTS FOLLOW-UP (OUTPATIENT)
Dept: FAMILY MEDICINE | Facility: CLINIC | Age: 70
End: 2025-02-23

## 2025-02-26 NOTE — PROGRESS NOTES
Endocrine Surgery History & Physical     REFERRING PROVIDER: Devan Dowling MD    REASON FOR VISIT: Hyperparathyroidism    HPI: Delgado Espinoza is a 69 y.o. female patient with a history notable for HTN who presents in consultation for primary hyperparathyroidism.  Suspicion for hyperparathyroidism was raised on routine laboratory testing to have elevated calcium levels.      Details of the workup are as follows:     Recent laboratory studies:  Hypercalcemia noted since 2020  Max calcium elevation to 12.2 mg/dL  Parathyroid hormone levels elevated with hypercalcemia  Most recent PTH level was 119 with a corresponding calcium of 11.6 with an albumin of 4.2  Phosphorus: 3.2  Vitamin D: 37 in 2024  24 hour urine calcium: 118 mg/24 hours, Benign Familial Hypocalciuric Hypercalcemia unlikely    Medications:  Vitamin D supplementation: no longer taking, historically on Ca-Vit D  Lithium, thiazide diuretics: no longer taking HCTZ, previously on losartan-HCTZ  Calcium supplements or calcimimetic: none  Proton pump inhibitors: none    Symptoms:   The patient reports the following symptoms: []musculoskeletal pain, []fractures, []nephrolithiasis, []GERD, []peptic ulcer disease, []abdominal pain, []polydipsia, []polyuria, []pruritis  The patient reports the following neurocognitive symptoms: []brain fog, []concentration difficulties, []fatigue, []forgetfulness, []mood/psychiatric disturbances  The patient denies dysphagia, globus sensation, compression symptoms, or anterior neck pain.  The patient denies hoarseness, voice changes or increased need to clear the throat.  Bone mineral density: []Osteoporosis [x]Osteopenia []Normal bone density.  Last DEXA 2022 with T-score -1.7 of Lumbar spine    Surgical risk factors:  Risk of concurrent thyroid disease low   Ultrasound of the thyroid normal in 01/2025  History of neck radiation: none  Prior neck surgery: none  Prior gastric or bypass surgery: none  Inflammatory bowel disease  "or malabsorption: none  Activity level and functional status: very active, can tolerate >4 METS  Cardiovascular risk: no recent echocardiogram  Antiplatelet therapy and anticoagulation: aspirin, fish oil    Family history:  No family history of endocrinopathies or endocrine cancers including pituitary tumors, pancreatic neuroendocrine tumors, medullary thyroid cancer or pheochromocytoma/paraganglioma.     Localization studies done prior to this visit:  Ultrasound: left inferior adenoma  Nuclear Medicine Parathyroid Scan: left inferior adenoma   4D-CT Parathyroid scan: none    LABORATORY STUDIES:  I personally and independently reviewed relevant lab test results, including the following:        Lab Results   Component Value Date    TSH 3.42 08/17/2023         PAST MEDICAL HISTORY:  Problem List[1]      PAST SURGICAL HISTORY:  Past Surgical History:   Procedure Laterality Date    AUGMENTATION OF BREAST      BREAST BIOPSY      BREAST CYST EXCISION      BREAST SURGERY      COLONOSCOPY N/A 01/11/2023    Procedure: COLONOSCOPY;  Surgeon: Sudhir James MD;  Location: [facility];  Service: Endoscopy;  Laterality: N/A;    MYOMECTOMY      OOPHORECTOMY      RIGHT OOPHORECTOMY          MEDICATIONS:  Current Medications[2]    ALLERGIES:  Review of patient's allergies indicates:  No Known Allergies    SOCIAL HISTORY:  Social History[3]      FAMILY HISTORY:  Family History   Problem Relation Name Age of Onset    Stroke Mother      Breast cancer Neg Hx      Ovarian cancer Neg Hx      Glaucoma Neg Hx      Macular degeneration Neg Hx          REVIEW OF SYSTEMS:  A detailed review of systems has been reviewed with the patient, pertinent positives and negatives are presented in the note and is otherwise negative.    PHYSICAL EXAMINATION:  Vital Signs: BP (!) 163/79   Pulse 69   Ht 5' 6" (1.676 m)   Wt 73.7 kg (162 lb 7.7 oz)   SpO2 98%   BMI 26.22 kg/m²     Constitutional: well-developed, well-nourished, no acute distress "   HENT: no lid lag, no exophthalmos, no scleral icterus, moist mucous membranes, good dentition  Neck: supple, trachea in midline, thyroid is soft and moves well with swallowing, no palpable nodules, adequate neck extension  Heme/Lymph: no cervical or supraclavicular lymphadenopathy  Respiratory: normal respiratory effort, no wheezes or stridor  Cardiovascular: regular rate and rhythm  Extremities: no edema  Skin: warm and dry, no rashes  Neurologic: no gross resting tremor of outstretched hands, voice strong  Vascular: radial pulses palpable bilaterally  Psychiatric: affect normal    IMAGING STUDIES:  I personally and independently reviewed, visualized and interpreted the images of the below listed radiology studies (including the most recent ultrasound and NM scan) and my findings are notable for a normal appearing thyroid with a hypoechoic lesion inferior to the left thyroid lobe corresponding to the area of radiotracer retention on NM scan.  Reports below for reference.    NM Parathyroid Scan with SPECT and CT 02/14/2025  CLINICAL HISTORY:Primary hyperparathyroidism 69-year-old female with elevated serum calcium of  12.2 and PTH of 119.  Normal renal function  TECHNIQUE:Following injection of 22.3 MCi Tc99m sestamibi and approximately 10 minute delay, anterior projection images of the neck and chest were obtained. After a two-hour delay, repeat anterior projection images of the neck were acquired. SPECT  CT images of the neck and upper thorax were also acquired.  COMPARISON:US 01/17/25    FINDINGS:  There is physiological tracer uptake in the myocardium, salivary glands, and thyroid gland.    Early images demonstrate uniform uptake in the thyroid gland with  focal increased uptake inferior left lobe    Delayed images demonstrate washout from the thyroid gland with focal persistent uptake inferior left lobe to suggest parathyroid adenoma    SPECT CT images demonstrate similar findings    CT - Refer to dedicate  CT    Impression  Scintigraphic evidence of parathyroid adenoma inferior left lobe of the thyroid corresponding to the ultrasound findings.    No evidence of ectopic parathyroid adenoma.    Electronically signed by: Nimco Fields  Date:    02/14/2025  Time:    14:13      US Soft Tissue Head Neck 01/17/2025  CLINICAL HISTORY:Primary hyperparathyroidism  TECHNIQUE:Real time ultrasound was performed in 2 planes using a [employer] Aff[employer]i 50G ultrasound machine.    FINDINGS:  Thyroid gland has homogeneous echotexture and normal vascularity.    RIGHT LOBE:  Right lobe of the thyroid measures 4.1 x 1.5 x 1.4 cm.  No distinct thyroid nodules are seen.    LEFT LOBE:  Left lobe of the thyroid measures 4.2 x 1.4 x 1.2 cm.  No distinct thyroid nodules are seen.  1.2 x 0.5 x 0.9 cm hypoechoic focus is seen below the left inferior pole of the thyroid.  This is separate from the normal thyroid parenchyma.  Polar artery flow is seen.    ISTHMUS:  Isthmus measures 0.4 cm.    LYMPH NODES:  No abnormal lymph nodes seen.    COMPARISON:  No prior neck ultrasound is available for comparison.    Impression  1.) Thyroid gland is normal in size with homogeneous echotexture and normal vascularity    2.) 1.2 x 0.5 x 0.9 cm hypoechoic focus is seen below the left inferior pole of the thyroid most likely representing a parathyroid adenoma    RECOMMENDATIONS:  Recommend correlation with nuclear medicine parathyroid imaging.    Electronically signed by: Devan Dowling MD, ECNU  Date:    01/17/2025  Time:    11:20      IMPRESSION AND PLAN:  I had the pleasure of seeing Ms. Espinoza in Endocrine Surgical consultation regarding the biochemical diagnosis of primary hyperparathyroidism.     We discussed that hyperparathyroidism can compromise bone and cardiovascular health. In addition to classic symptoms such as kidney stones and bone loss, hyperparathyroidism can be associated with multiple symptoms including fatigue, depression, memory loss, pruritis,  polyuria, nocturia, constipation, polydipsia, and musculoskeletal aches and pains. Hyperparathyroidism can also worsen hypertension.  I discussed the implications of non-operative observation as well as the standard of care surgical treatment of primary hyperparathyroidism.  Based on the current clinical findings and a thorough discussion about the risks, benefits, and alternatives, the patient elected to proceed with parathyroidectomy.      We extensively discussed the pros and cons for surgical intervention, in this case parathyroidectomy with intraoperative parathyroid hormone monitoring.  We discussed that in the majority of cases, a single adenoma is the culprit gland. However, multigland disease is possible and multigland disease has a lower likelihood of radiographic localization.  Parathyroidectomy for single gland disease is a same day surgery while four-gland parathyroid exploration may require an overnight stay. We discussed that in all cases, parathyroidectomy has an attendant risk of postoperative hypocalcemia which can be mitigated with calcium and vitamin D supplementation. Other possible complications associated with this may include, but may not be restricted to hoarseness, recurrent laryngeal nerve injury - temporary or permanent, superior laryngeal nerve injury - temporary or permanent, hypocalcemia and hypoparathyroidism - temporary or permanent, neck hematoma, bleeding, infection, scarring, wound healing problems and possible death. We discussed that in rare cases, parathyroid exploration may be negative. Recurrent and persistent disease are also possible.      All questions were answered and the patient expressed understanding of all the risks, benefits and alternatives, and agreed to proceed with the plan despite the risks.      1. Primary hyperparathyroidism  Assessment & Plan:  Primary hyperparathyroidism with significant hypercalcemia and osteopenia.  Localization studies suggest a left  sided parathyroid adenoma.    - Schedule parathyroidectomy with intraoperative PTH monitoring  - Preoperative education completed  - Surgical consent was signed and witnessed  - Reviewed need for postoperative calcium supplementation  - Preoperative labs ordered  - Maintain adequate hydration and avoid the use of calcium supplements  - OK for 1549-4285 IU over the counter vitamin D3 daily  - OK for dietary calcium of 1200 mg daily    Orders:  -     Ambulatory referral/consult to Endocrine Surgery  -     CBC Auto Differential; Future; Expected date: 02/27/2025  -     Comprehensive Metabolic Panel; Future; Expected date: 02/27/2025  -     Case Request Operating Room: PARATHYROIDECTOMY    Trinity Hawkins MD, FACS  Staff Surgeon  Endocrine Surgery  2/27/25           [1]   Patient Active Problem List  Diagnosis    Primary hyperparathyroidism   [2]   Current Outpatient Medications   Medication Sig Dispense Refill    amLODIPine (NORVASC) 5 MG tablet Take 1 tablet (5 mg total) by mouth once daily. 90 tablet 1    aspirin 81 MG Chew Take 81 mg by mouth once daily.      fish oil-omega-3 fatty acids 300-1,000 mg capsule Take 2 g by mouth once daily.      fluticasone propionate (FLONASE) 50 mcg/actuation nasal spray 1 spray (50 mcg total) by Each Nostril route once daily. 9.9 mL 0    levocetirizine (XYZAL) 5 MG tablet TAKE 1 TABLET(5 MG) BY MOUTH EVERY EVENING 90 tablet 1    multivitamin (THERAGRAN) per tablet Take 1 tablet by mouth once daily.      olmesartan (BENICAR) 40 MG tablet Take 1 tablet (40 mg total) by mouth once daily. 90 tablet 1     No current facility-administered medications for this visit.   [3]   Social History  Tobacco Use    Smoking status: Never     Passive exposure: Never    Smokeless tobacco: Never   Substance Use Topics    Alcohol use: Yes     Comment: rare    Drug use: No

## 2025-02-26 NOTE — H&P (VIEW-ONLY)
Endocrine Surgery History & Physical     REFERRING PROVIDER: Devan Dowling MD    REASON FOR VISIT: Hyperparathyroidism    HPI: Delgado Espinoza is a 69 y.o. female patient with a history notable for HTN who presents in consultation for primary hyperparathyroidism.  Suspicion for hyperparathyroidism was raised on routine laboratory testing to have elevated calcium levels.      Details of the workup are as follows:     Recent laboratory studies:  Hypercalcemia noted since 2020  Max calcium elevation to 12.2 mg/dL  Parathyroid hormone levels elevated with hypercalcemia  Most recent PTH level was 119 with a corresponding calcium of 11.6 with an albumin of 4.2  Phosphorus: 3.2  Vitamin D: 37 in 2024  24 hour urine calcium: 118 mg/24 hours, Benign Familial Hypocalciuric Hypercalcemia unlikely    Medications:  Vitamin D supplementation: no longer taking, historically on Ca-Vit D  Lithium, thiazide diuretics: no longer taking HCTZ, previously on losartan-HCTZ  Calcium supplements or calcimimetic: none  Proton pump inhibitors: none    Symptoms:   The patient reports the following symptoms: []musculoskeletal pain, []fractures, []nephrolithiasis, []GERD, []peptic ulcer disease, []abdominal pain, []polydipsia, []polyuria, []pruritis  The patient reports the following neurocognitive symptoms: []brain fog, []concentration difficulties, []fatigue, []forgetfulness, []mood/psychiatric disturbances  The patient denies dysphagia, globus sensation, compression symptoms, or anterior neck pain.  The patient denies hoarseness, voice changes or increased need to clear the throat.  Bone mineral density: []Osteoporosis [x]Osteopenia []Normal bone density.  Last DEXA 2022 with T-score -1.7 of Lumbar spine    Surgical risk factors:  Risk of concurrent thyroid disease low   Ultrasound of the thyroid normal in 01/2025  History of neck radiation: none  Prior neck surgery: none  Prior gastric or bypass surgery: none  Inflammatory bowel disease  "or malabsorption: none  Activity level and functional status: very active, can tolerate >4 METS  Cardiovascular risk: no recent echocardiogram  Antiplatelet therapy and anticoagulation: aspirin, fish oil    Family history:  No family history of endocrinopathies or endocrine cancers including pituitary tumors, pancreatic neuroendocrine tumors, medullary thyroid cancer or pheochromocytoma/paraganglioma.     Localization studies done prior to this visit:  Ultrasound: left inferior adenoma  Nuclear Medicine Parathyroid Scan: left inferior adenoma   4D-CT Parathyroid scan: none    LABORATORY STUDIES:  I personally and independently reviewed relevant lab test results, including the following:        Lab Results   Component Value Date    TSH 3.42 08/17/2023         PAST MEDICAL HISTORY:  Problem List[1]      PAST SURGICAL HISTORY:  Past Surgical History:   Procedure Laterality Date    AUGMENTATION OF BREAST      BREAST BIOPSY      BREAST CYST EXCISION      BREAST SURGERY      COLONOSCOPY N/A 01/11/2023    Procedure: COLONOSCOPY;  Surgeon: Sudhir James MD;  Location: [facility];  Service: Endoscopy;  Laterality: N/A;    MYOMECTOMY      OOPHORECTOMY      RIGHT OOPHORECTOMY          MEDICATIONS:  Current Medications[2]    ALLERGIES:  Review of patient's allergies indicates:  No Known Allergies    SOCIAL HISTORY:  Social History[3]      FAMILY HISTORY:  Family History   Problem Relation Name Age of Onset    Stroke Mother      Breast cancer Neg Hx      Ovarian cancer Neg Hx      Glaucoma Neg Hx      Macular degeneration Neg Hx          REVIEW OF SYSTEMS:  A detailed review of systems has been reviewed with the patient, pertinent positives and negatives are presented in the note and is otherwise negative.    PHYSICAL EXAMINATION:  Vital Signs: BP (!) 163/79   Pulse 69   Ht 5' 6" (1.676 m)   Wt 73.7 kg (162 lb 7.7 oz)   SpO2 98%   BMI 26.22 kg/m²     Constitutional: well-developed, well-nourished, no acute distress "   HENT: no lid lag, no exophthalmos, no scleral icterus, moist mucous membranes, good dentition  Neck: supple, trachea in midline, thyroid is soft and moves well with swallowing, no palpable nodules, adequate neck extension  Heme/Lymph: no cervical or supraclavicular lymphadenopathy  Respiratory: normal respiratory effort, no wheezes or stridor  Cardiovascular: regular rate and rhythm  Extremities: no edema  Skin: warm and dry, no rashes  Neurologic: no gross resting tremor of outstretched hands, voice strong  Vascular: radial pulses palpable bilaterally  Psychiatric: affect normal    IMAGING STUDIES:  I personally and independently reviewed, visualized and interpreted the images of the below listed radiology studies (including the most recent ultrasound and NM scan) and my findings are notable for a normal appearing thyroid with a hypoechoic lesion inferior to the left thyroid lobe corresponding to the area of radiotracer retention on NM scan.  Reports below for reference.    NM Parathyroid Scan with SPECT and CT 02/14/2025  CLINICAL HISTORY:Primary hyperparathyroidism 69-year-old female with elevated serum calcium of  12.2 and PTH of 119.  Normal renal function  TECHNIQUE:Following injection of 22.3 MCi Tc99m sestamibi and approximately 10 minute delay, anterior projection images of the neck and chest were obtained. After a two-hour delay, repeat anterior projection images of the neck were acquired. SPECT  CT images of the neck and upper thorax were also acquired.  COMPARISON:US 01/17/25    FINDINGS:  There is physiological tracer uptake in the myocardium, salivary glands, and thyroid gland.    Early images demonstrate uniform uptake in the thyroid gland with  focal increased uptake inferior left lobe    Delayed images demonstrate washout from the thyroid gland with focal persistent uptake inferior left lobe to suggest parathyroid adenoma    SPECT CT images demonstrate similar findings    CT - Refer to dedicate  CT    Impression  Scintigraphic evidence of parathyroid adenoma inferior left lobe of the thyroid corresponding to the ultrasound findings.    No evidence of ectopic parathyroid adenoma.    Electronically signed by: Nimco Fields  Date:    02/14/2025  Time:    14:13      US Soft Tissue Head Neck 01/17/2025  CLINICAL HISTORY:Primary hyperparathyroidism  TECHNIQUE:Real time ultrasound was performed in 2 planes using a [employer] 50G ultrasound machine.    FINDINGS:  Thyroid gland has homogeneous echotexture and normal vascularity.    RIGHT LOBE:  Right lobe of the thyroid measures 4.1 x 1.5 x 1.4 cm.  No distinct thyroid nodules are seen.    LEFT LOBE:  Left lobe of the thyroid measures 4.2 x 1.4 x 1.2 cm.  No distinct thyroid nodules are seen.  1.2 x 0.5 x 0.9 cm hypoechoic focus is seen below the left inferior pole of the thyroid.  This is separate from the normal thyroid parenchyma.  Polar artery flow is seen.    ISTHMUS:  Isthmus measures 0.4 cm.    LYMPH NODES:  No abnormal lymph nodes seen.    COMPARISON:  No prior neck ultrasound is available for comparison.    Impression  1.) Thyroid gland is normal in size with homogeneous echotexture and normal vascularity    2.) 1.2 x 0.5 x 0.9 cm hypoechoic focus is seen below the left inferior pole of the thyroid most likely representing a parathyroid adenoma    RECOMMENDATIONS:  Recommend correlation with nuclear medicine parathyroid imaging.    Electronically signed by: Devan Dowling MD, ECNU  Date:    01/17/2025  Time:    11:20      IMPRESSION AND PLAN:  I had the pleasure of seeing Ms. Espinoza in Endocrine Surgical consultation regarding the biochemical diagnosis of primary hyperparathyroidism.     We discussed that hyperparathyroidism can compromise bone and cardiovascular health. In addition to classic symptoms such as kidney stones and bone loss, hyperparathyroidism can be associated with multiple symptoms including fatigue, depression, memory loss, pruritis,  polyuria, nocturia, constipation, polydipsia, and musculoskeletal aches and pains. Hyperparathyroidism can also worsen hypertension.  I discussed the implications of non-operative observation as well as the standard of care surgical treatment of primary hyperparathyroidism.  Based on the current clinical findings and a thorough discussion about the risks, benefits, and alternatives, the patient elected to proceed with parathyroidectomy.      We extensively discussed the pros and cons for surgical intervention, in this case parathyroidectomy with intraoperative parathyroid hormone monitoring.  We discussed that in the majority of cases, a single adenoma is the culprit gland. However, multigland disease is possible and multigland disease has a lower likelihood of radiographic localization.  Parathyroidectomy for single gland disease is a same day surgery while four-gland parathyroid exploration may require an overnight stay. We discussed that in all cases, parathyroidectomy has an attendant risk of postoperative hypocalcemia which can be mitigated with calcium and vitamin D supplementation. Other possible complications associated with this may include, but may not be restricted to hoarseness, recurrent laryngeal nerve injury - temporary or permanent, superior laryngeal nerve injury - temporary or permanent, hypocalcemia and hypoparathyroidism - temporary or permanent, neck hematoma, bleeding, infection, scarring, wound healing problems and possible death. We discussed that in rare cases, parathyroid exploration may be negative. Recurrent and persistent disease are also possible.      All questions were answered and the patient expressed understanding of all the risks, benefits and alternatives, and agreed to proceed with the plan despite the risks.      1. Primary hyperparathyroidism  Assessment & Plan:  Primary hyperparathyroidism with significant hypercalcemia and osteopenia.  Localization studies suggest a left  sided parathyroid adenoma.    - Schedule parathyroidectomy with intraoperative PTH monitoring  - Preoperative education completed  - Surgical consent was signed and witnessed  - Reviewed need for postoperative calcium supplementation  - Preoperative labs ordered  - Maintain adequate hydration and avoid the use of calcium supplements  - OK for 7588-2377 IU over the counter vitamin D3 daily  - OK for dietary calcium of 1200 mg daily    Orders:  -     Ambulatory referral/consult to Endocrine Surgery  -     CBC Auto Differential; Future; Expected date: 02/27/2025  -     Comprehensive Metabolic Panel; Future; Expected date: 02/27/2025  -     Case Request Operating Room: PARATHYROIDECTOMY    Trinity Hawkins MD, FACS  Staff Surgeon  Endocrine Surgery  2/27/25           [1]   Patient Active Problem List  Diagnosis    Primary hyperparathyroidism   [2]   Current Outpatient Medications   Medication Sig Dispense Refill    amLODIPine (NORVASC) 5 MG tablet Take 1 tablet (5 mg total) by mouth once daily. 90 tablet 1    aspirin 81 MG Chew Take 81 mg by mouth once daily.      fish oil-omega-3 fatty acids 300-1,000 mg capsule Take 2 g by mouth once daily.      fluticasone propionate (FLONASE) 50 mcg/actuation nasal spray 1 spray (50 mcg total) by Each Nostril route once daily. 9.9 mL 0    levocetirizine (XYZAL) 5 MG tablet TAKE 1 TABLET(5 MG) BY MOUTH EVERY EVENING 90 tablet 1    multivitamin (THERAGRAN) per tablet Take 1 tablet by mouth once daily.      olmesartan (BENICAR) 40 MG tablet Take 1 tablet (40 mg total) by mouth once daily. 90 tablet 1     No current facility-administered medications for this visit.   [3]   Social History  Tobacco Use    Smoking status: Never     Passive exposure: Never    Smokeless tobacco: Never   Substance Use Topics    Alcohol use: Yes     Comment: rare    Drug use: No

## 2025-02-27 ENCOUNTER — OFFICE VISIT (OUTPATIENT)
Dept: SURGERY | Facility: CLINIC | Age: 70
End: 2025-02-27
Payer: MEDICARE

## 2025-02-27 ENCOUNTER — LAB VISIT (OUTPATIENT)
Dept: LAB | Facility: HOSPITAL | Age: 70
End: 2025-02-27
Attending: STUDENT IN AN ORGANIZED HEALTH CARE EDUCATION/TRAINING PROGRAM
Payer: MEDICARE

## 2025-02-27 ENCOUNTER — RESULTS FOLLOW-UP (OUTPATIENT)
Dept: SURGERY | Facility: CLINIC | Age: 70
End: 2025-02-27

## 2025-02-27 VITALS
OXYGEN SATURATION: 98 % | WEIGHT: 162.5 LBS | SYSTOLIC BLOOD PRESSURE: 163 MMHG | DIASTOLIC BLOOD PRESSURE: 79 MMHG | HEIGHT: 66 IN | BODY MASS INDEX: 26.12 KG/M2 | HEART RATE: 69 BPM

## 2025-02-27 DIAGNOSIS — E21.0 PRIMARY HYPERPARATHYROIDISM: ICD-10-CM

## 2025-02-27 LAB
ALBUMIN SERPL BCP-MCNC: 4 G/DL (ref 3.5–5.2)
ALP SERPL-CCNC: 126 U/L (ref 40–150)
ALT SERPL W/O P-5'-P-CCNC: 21 U/L (ref 10–44)
ANION GAP SERPL CALC-SCNC: 6 MMOL/L (ref 8–16)
AST SERPL-CCNC: 28 U/L (ref 10–40)
BASOPHILS # BLD AUTO: 0.04 K/UL (ref 0–0.2)
BASOPHILS NFR BLD: 0.7 % (ref 0–1.9)
BILIRUB SERPL-MCNC: 0.4 MG/DL (ref 0.1–1)
BUN SERPL-MCNC: 19 MG/DL (ref 8–23)
CALCIUM SERPL-MCNC: 11.3 MG/DL (ref 8.7–10.5)
CHLORIDE SERPL-SCNC: 106 MMOL/L (ref 95–110)
CO2 SERPL-SCNC: 28 MMOL/L (ref 23–29)
CREAT SERPL-MCNC: 0.8 MG/DL (ref 0.5–1.4)
DIFFERENTIAL METHOD BLD: ABNORMAL
EOSINOPHIL # BLD AUTO: 0.6 K/UL (ref 0–0.5)
EOSINOPHIL NFR BLD: 9.4 % (ref 0–8)
ERYTHROCYTE [DISTWIDTH] IN BLOOD BY AUTOMATED COUNT: 16.7 % (ref 11.5–14.5)
EST. GFR  (NO RACE VARIABLE): >60 ML/MIN/1.73 M^2
GLUCOSE SERPL-MCNC: 86 MG/DL (ref 70–110)
HCT VFR BLD AUTO: 41.3 % (ref 37–48.5)
HGB BLD-MCNC: 13.4 G/DL (ref 12–16)
IMM GRANULOCYTES # BLD AUTO: 0.04 K/UL (ref 0–0.04)
IMM GRANULOCYTES NFR BLD AUTO: 0.7 % (ref 0–0.5)
LYMPHOCYTES # BLD AUTO: 1.3 K/UL (ref 1–4.8)
LYMPHOCYTES NFR BLD: 22.1 % (ref 18–48)
MCH RBC QN AUTO: 25.3 PG (ref 27–31)
MCHC RBC AUTO-ENTMCNC: 32.4 G/DL (ref 32–36)
MCV RBC AUTO: 78 FL (ref 82–98)
MONOCYTES # BLD AUTO: 0.5 K/UL (ref 0.3–1)
MONOCYTES NFR BLD: 9 % (ref 4–15)
NEUTROPHILS # BLD AUTO: 3.4 K/UL (ref 1.8–7.7)
NEUTROPHILS NFR BLD: 58.1 % (ref 38–73)
NRBC BLD-RTO: 0 /100 WBC
PLATELET # BLD AUTO: 264 K/UL (ref 150–450)
PMV BLD AUTO: 10.3 FL (ref 9.2–12.9)
POTASSIUM SERPL-SCNC: 4.4 MMOL/L (ref 3.5–5.1)
PROT SERPL-MCNC: 8.1 G/DL (ref 6–8.4)
RBC # BLD AUTO: 5.29 M/UL (ref 4–5.4)
SODIUM SERPL-SCNC: 140 MMOL/L (ref 136–145)
WBC # BLD AUTO: 5.87 K/UL (ref 3.9–12.7)

## 2025-02-27 PROCEDURE — 36415 COLL VENOUS BLD VENIPUNCTURE: CPT | Performed by: STUDENT IN AN ORGANIZED HEALTH CARE EDUCATION/TRAINING PROGRAM

## 2025-02-27 PROCEDURE — 85025 COMPLETE CBC W/AUTO DIFF WBC: CPT | Performed by: STUDENT IN AN ORGANIZED HEALTH CARE EDUCATION/TRAINING PROGRAM

## 2025-02-27 PROCEDURE — 3078F DIAST BP <80 MM HG: CPT | Mod: CPTII,S$GLB,, | Performed by: STUDENT IN AN ORGANIZED HEALTH CARE EDUCATION/TRAINING PROGRAM

## 2025-02-27 PROCEDURE — 1159F MED LIST DOCD IN RCRD: CPT | Mod: CPTII,S$GLB,, | Performed by: STUDENT IN AN ORGANIZED HEALTH CARE EDUCATION/TRAINING PROGRAM

## 2025-02-27 PROCEDURE — 80053 COMPREHEN METABOLIC PANEL: CPT | Performed by: STUDENT IN AN ORGANIZED HEALTH CARE EDUCATION/TRAINING PROGRAM

## 2025-02-27 PROCEDURE — 1101F PT FALLS ASSESS-DOCD LE1/YR: CPT | Mod: CPTII,S$GLB,, | Performed by: STUDENT IN AN ORGANIZED HEALTH CARE EDUCATION/TRAINING PROGRAM

## 2025-02-27 PROCEDURE — 4010F ACE/ARB THERAPY RXD/TAKEN: CPT | Mod: CPTII,S$GLB,, | Performed by: STUDENT IN AN ORGANIZED HEALTH CARE EDUCATION/TRAINING PROGRAM

## 2025-02-27 PROCEDURE — 99205 OFFICE O/P NEW HI 60 MIN: CPT | Mod: S$GLB,,, | Performed by: STUDENT IN AN ORGANIZED HEALTH CARE EDUCATION/TRAINING PROGRAM

## 2025-02-27 PROCEDURE — 3288F FALL RISK ASSESSMENT DOCD: CPT | Mod: CPTII,S$GLB,, | Performed by: STUDENT IN AN ORGANIZED HEALTH CARE EDUCATION/TRAINING PROGRAM

## 2025-02-27 PROCEDURE — 3008F BODY MASS INDEX DOCD: CPT | Mod: CPTII,S$GLB,, | Performed by: STUDENT IN AN ORGANIZED HEALTH CARE EDUCATION/TRAINING PROGRAM

## 2025-02-27 PROCEDURE — 99999 PR PBB SHADOW E&M-EST. PATIENT-LVL IV: CPT | Mod: PBBFAC,,, | Performed by: STUDENT IN AN ORGANIZED HEALTH CARE EDUCATION/TRAINING PROGRAM

## 2025-02-27 PROCEDURE — 3077F SYST BP >= 140 MM HG: CPT | Mod: CPTII,S$GLB,, | Performed by: STUDENT IN AN ORGANIZED HEALTH CARE EDUCATION/TRAINING PROGRAM

## 2025-02-27 PROCEDURE — 1126F AMNT PAIN NOTED NONE PRSNT: CPT | Mod: CPTII,S$GLB,, | Performed by: STUDENT IN AN ORGANIZED HEALTH CARE EDUCATION/TRAINING PROGRAM

## 2025-02-27 NOTE — ASSESSMENT & PLAN NOTE
Primary hyperparathyroidism with significant hypercalcemia and osteopenia.  Localization studies suggest a left sided parathyroid adenoma.    - Schedule parathyroidectomy with intraoperative PTH monitoring  - Preoperative education completed  - Surgical consent was signed and witnessed  - Reviewed need for postoperative calcium supplementation  - Preoperative labs ordered  - Maintain adequate hydration and avoid the use of calcium supplements  - OK for 0645-8658 IU over the counter vitamin D3 daily  - OK for dietary calcium of 1200 mg daily

## 2025-03-12 NOTE — PRE-PROCEDURE INSTRUCTIONS
PreOp Instructions given:   - Verbal medication information (what to hold and what to take)   - NPO guidelines 2400  - Arrival place directions given; time to be given the day before procedure by the   Surgeon's Office DOCS   - Bathing with antibacterial soap   - Don't wear any jewelry or bring any valuables AM of surgery   - No makeup or moisturizer to face   - No perfume/cologne, powder, lotions or aftershave   Pt. verbalized understanding.   Pt denies any h/o Anesthesia/Sedation complications or side effects.  Patient does not know arrival time.  Explained that this information comes from the surgeon's office and if they haven't heard from them by 2 or 3 pm to call the office.  Patient stated an understanding.

## 2025-03-13 ENCOUNTER — ANESTHESIA EVENT (OUTPATIENT)
Dept: SURGERY | Facility: HOSPITAL | Age: 70
End: 2025-03-13
Payer: MEDICARE

## 2025-03-13 ENCOUNTER — PATIENT MESSAGE (OUTPATIENT)
Dept: SURGERY | Facility: CLINIC | Age: 70
End: 2025-03-13
Payer: MEDICARE

## 2025-03-13 ENCOUNTER — TELEPHONE (OUTPATIENT)
Dept: SURGERY | Facility: CLINIC | Age: 70
End: 2025-03-13
Payer: MEDICARE

## 2025-03-13 DIAGNOSIS — Z90.89 S/P PARATHYROIDECTOMY: Primary | ICD-10-CM

## 2025-03-13 DIAGNOSIS — Z98.890 S/P PARATHYROIDECTOMY: Primary | ICD-10-CM

## 2025-03-13 NOTE — TELEPHONE ENCOUNTER
Unable to leave message on patient's voicemail - voicemail is full.  Message sent to [facility] for her to arrive at the [facility] tomorrow 3/14/25 at 9:45am for surgery with Dr. Hawkins.  Pre-Op instructions reinforced.  Direct phone number given for her to call back with any questions or concerns.

## 2025-03-13 NOTE — TELEPHONE ENCOUNTER
----- Message from Farooq sent at 3/13/2025  8:41 AM CDT -----  Name of Caller: Nature of Call:Returning a missed call Best Call Back Number:[phone] Additional Information:RICHETTA BLACK calling regarding Patient Advice . The pt is returning a missed call. please call back to assist

## 2025-03-13 NOTE — ANESTHESIA PREPROCEDURE EVALUATION
[facility]  Anesthesia Pre-Operative Evaluation         Patient Name: Delgado Espinoza  YOB: 1955  MRN: [id number]    SUBJECTIVE:     Pre-operative evaluation for Procedure(s) (LRB):  PARATHYROIDECTOMY (N/A)     03/13/2025    Delgado Espinoza is a 69 y.o. female w/ a significant PMHx of HTN and primary hyperparathyroidism who now presents for the above procedure(s).      LDA:  None documented     Prev airway: None documented.    Drips: None documented.    Problem List[1]    Review of patient's allergies indicates:  No Known Allergies    Current Outpatient Medications:  Current Medications[2]    Past Surgical History:   Procedure Laterality Date    AUGMENTATION OF BREAST      BREAST BIOPSY      BREAST CYST EXCISION      BREAST SURGERY      COLONOSCOPY N/A 01/11/2023    Procedure: COLONOSCOPY;  Surgeon: Sudhir James MD;  Location: [facility];  Service: Endoscopy;  Laterality: N/A;    MYOMECTOMY      OOPHORECTOMY      RIGHT OOPHORECTOMY         Social History[3]    OBJECTIVE:     Vital Signs Range (Last 24H):         Significant Labs:  Lab Results   Component Value Date    WBC 5.87 02/27/2025    HGB 13.4 02/27/2025    HCT 41.3 02/27/2025     02/27/2025    CHOL 197 12/02/2024    TRIG 62 12/02/2024    HDL 68 12/02/2024    ALT 21 02/27/2025    AST 28 02/27/2025     02/27/2025    K 4.4 02/27/2025     02/27/2025    CREATININE 0.8 02/27/2025    BUN 19 02/27/2025    CO2 28 02/27/2025    TSH 3.42 08/17/2023       Diagnostic Studies: No relevant studies.    EKG:   Results for orders placed or performed during the hospital encounter of 03/23/14   EKG 12-lead    Collection Time: 03/23/14  7:52 PM    Narrative    Test Reason : 786.50  Blood Pressure : mmHG  Vent. Rate : 081 BPM     Atrial Rate : 081 BPM     P-R Int : 150 ms          QRS Dur : 078 ms      QT Int : 392 ms       P-R-T Axes : 075 013 066 degrees     QTc Int : 455 ms    Normal sinus rhythm  Possible Left  atrial enlargement  Septal infarct ,age undetermined  Abnormal ECG  No previous ECGs available  Confirmed by TASHA NAVARRO MD ([id number]) on 3/24/2014 3:43:25 PM    Referred By: SELF REFERRAL           Confirmed By:TASHA NAVARRO MD       2D ECHO:  TTE:  No results found for this or any previous visit.    ELIZABETH:  No results found for this or any previous visit.    ASSESSMENT/PLAN:                                                                                                                  03/13/2025  Delgado Espinoza is a 69 y.o., female.      Pre-op Assessment    I have reviewed the Patient Summary Reports.     I have reviewed the Nursing Notes. I have reviewed the NPO Status.   I have reviewed the Medications.     Review of Systems  Anesthesia Hx:  No problems with previous Anesthesia   History of prior surgery of interest to airway management or planning:          Denies Family Hx of Anesthesia complications.    Denies Personal Hx of Anesthesia complications.                    Social:  Non-Smoker       Hematology/Oncology:  Hematology Normal   Oncology Normal                                   EENT/Dental:  EENT/Dental Normal           Cardiovascular:     Hypertension   Denies MI.  Denies CAD.                                          Pulmonary:    Denies COPD.                     Renal/:  Renal/ Normal                 Hepatic/GI:      Denies GERD.                Musculoskeletal:  Musculoskeletal Normal                Neurological:  Neurology Normal                                      Endocrine:  Endocrine Normal          Denies Obesity / BMI > 30  Dermatological:  Skin Normal    Psych:  Psychiatric Normal                  Physical Exam  General: Alert and Oriented    Airway:  Mallampati: II / II  Mouth Opening: Normal  TM Distance: Normal  Tongue: Normal  Neck ROM: Normal ROM    Dental:  Intact    Chest/Lungs:  Clear to auscultation, Normal Respiratory Rate    Heart:  Rate: Normal  Rhythm: Regular Rhythm  Sounds:  Normal    Anesthesia Plan  Type of Anesthesia, risks & benefits discussed:    Anesthesia Type: Gen ETT  Intra-op Monitoring Plan: Standard ASA Monitors and Art Line  Post Op Pain Control Plan: multimodal analgesia and IV/PO Opioids PRN  Induction:  IV  Airway Plan: Direct, Post-Induction  Informed Consent: Informed consent signed with the Patient and all parties understand the risks and agree with anesthesia plan.  All questions answered.   ASA Score: 2  Day of Surgery Review of History & Physical: H&P Update referred to the surgeon/provider.    Ready For Surgery From Anesthesia Perspective.     .             [1]  Patient Active Problem List  Diagnosis    Primary hyperparathyroidism   [2]  No current facility-administered medications for this encounter.    Current Outpatient Medications:     fish oil-omega-3 fatty acids 300-1,000 mg capsule, Take 2 g by mouth once daily., Disp: , Rfl:     amLODIPine (NORVASC) 5 MG tablet, Take 1 tablet (5 mg total) by mouth once daily. (Patient taking differently: Take 5 mg by mouth every evening.), Disp: 90 tablet, Rfl: 1    aspirin 81 MG Chew, Take 81 mg by mouth nightly., Disp: , Rfl:     fluticasone propionate (FLONASE) 50 mcg/actuation nasal spray, 1 spray (50 mcg total) by Each Nostril route once daily. (Patient not taking: Reported on 3/12/2025), Disp: 9.9 mL, Rfl: 0    levocetirizine (XYZAL) 5 MG tablet, TAKE 1 TABLET(5 MG) BY MOUTH EVERY EVENING (Patient not taking: Reported on 3/12/2025), Disp: 90 tablet, Rfl: 1    multivitamin (THERAGRAN) per tablet, Take 1 tablet by mouth once daily., Disp: , Rfl:     olmesartan (BENICAR) 40 MG tablet, Take 1 tablet (40 mg total) by mouth once daily. (Patient taking differently: Take 40 mg by mouth every evening.), Disp: 90 tablet, Rfl: 1  [3]  Social History  Socioeconomic History    Marital status: Single   Tobacco Use    Smoking status: Never     Passive exposure: Never    Smokeless tobacco: Never   Substance and Sexual  Activity    Alcohol use: Yes     Comment: rare    Drug use: No    Sexual activity: Not Currently

## 2025-03-13 NOTE — TELEPHONE ENCOUNTER
Notified patient to arrive at the [facility] tomorrow 3/14/25 at 9:45am for surgery with Dr. Hawkins.  Pre-Op instructions reinforced.  She verbalized understanding.

## 2025-03-14 ENCOUNTER — HOSPITAL ENCOUNTER (OUTPATIENT)
Facility: HOSPITAL | Age: 70
Discharge: HOME | End: 2025-03-14
Attending: STUDENT IN AN ORGANIZED HEALTH CARE EDUCATION/TRAINING PROGRAM | Admitting: STUDENT IN AN ORGANIZED HEALTH CARE EDUCATION/TRAINING PROGRAM
Payer: MEDICARE

## 2025-03-14 ENCOUNTER — ANESTHESIA (OUTPATIENT)
Dept: SURGERY | Facility: HOSPITAL | Age: 70
End: 2025-03-14
Payer: MEDICARE

## 2025-03-14 VITALS
OXYGEN SATURATION: 100 % | SYSTOLIC BLOOD PRESSURE: 142 MMHG | DIASTOLIC BLOOD PRESSURE: 74 MMHG | TEMPERATURE: 98 F | HEART RATE: 79 BPM | RESPIRATION RATE: 17 BRPM

## 2025-03-14 DIAGNOSIS — E21.3 HYPERPARATHYROIDISM: Primary | ICD-10-CM

## 2025-03-14 LAB
PTH-INTACT SERPL-MCNC: 110.4 PG/ML
PTH-INTACT SERPL-MCNC: 222.7 PG/ML
PTH-INTACT SERPL-MCNC: 31 PG/ML
PTH-INTACT SERPL-MCNC: 33.3 PG/ML
PTH-INTACT SERPL-MCNC: 39.9 PG/ML
PTH-INTACT SERPL-MCNC: 60 PG/ML

## 2025-03-14 PROCEDURE — 88305 TISSUE EXAM BY PATHOLOGIST: CPT | Performed by: PATHOLOGY

## 2025-03-14 PROCEDURE — 63600175 PHARM REV CODE 636 W HCPCS

## 2025-03-14 PROCEDURE — 71000044 HC DOSC ROUTINE RECOVERY FIRST HOUR: Performed by: STUDENT IN AN ORGANIZED HEALTH CARE EDUCATION/TRAINING PROGRAM

## 2025-03-14 PROCEDURE — 36000706: Performed by: STUDENT IN AN ORGANIZED HEALTH CARE EDUCATION/TRAINING PROGRAM

## 2025-03-14 PROCEDURE — 83970 ASSAY OF PARATHORMONE: CPT | Mod: 91 | Performed by: STUDENT IN AN ORGANIZED HEALTH CARE EDUCATION/TRAINING PROGRAM

## 2025-03-14 PROCEDURE — 63600175 PHARM REV CODE 636 W HCPCS: Performed by: STUDENT IN AN ORGANIZED HEALTH CARE EDUCATION/TRAINING PROGRAM

## 2025-03-14 PROCEDURE — 36000707: Performed by: STUDENT IN AN ORGANIZED HEALTH CARE EDUCATION/TRAINING PROGRAM

## 2025-03-14 PROCEDURE — 37000008 HC ANESTHESIA 1ST 15 MINUTES: Performed by: STUDENT IN AN ORGANIZED HEALTH CARE EDUCATION/TRAINING PROGRAM

## 2025-03-14 PROCEDURE — 71000015 HC POSTOP RECOV 1ST HR: Performed by: STUDENT IN AN ORGANIZED HEALTH CARE EDUCATION/TRAINING PROGRAM

## 2025-03-14 PROCEDURE — 25000003 PHARM REV CODE 250

## 2025-03-14 PROCEDURE — 25000003 PHARM REV CODE 250: Performed by: STUDENT IN AN ORGANIZED HEALTH CARE EDUCATION/TRAINING PROGRAM

## 2025-03-14 PROCEDURE — 36620 INSERTION CATHETER ARTERY: CPT | Mod: 59,,, | Performed by: ANESTHESIOLOGY

## 2025-03-14 PROCEDURE — 37000009 HC ANESTHESIA EA ADD 15 MINS: Performed by: STUDENT IN AN ORGANIZED HEALTH CARE EDUCATION/TRAINING PROGRAM

## 2025-03-14 PROCEDURE — 88331 PATH CONSLTJ SURG 1 BLK 1SPC: CPT | Performed by: PATHOLOGY

## 2025-03-14 PROCEDURE — 94761 N-INVAS EAR/PLS OXIMETRY MLT: CPT

## 2025-03-14 PROCEDURE — D9220A PRA ANESTHESIA: Mod: ,,, | Performed by: ANESTHESIOLOGY

## 2025-03-14 PROCEDURE — 60500 EXPLORE PARATHYROID GLANDS: CPT | Mod: ,,, | Performed by: STUDENT IN AN ORGANIZED HEALTH CARE EDUCATION/TRAINING PROGRAM

## 2025-03-14 PROCEDURE — 71000033 HC RECOVERY, INTIAL HOUR: Performed by: STUDENT IN AN ORGANIZED HEALTH CARE EDUCATION/TRAINING PROGRAM

## 2025-03-14 RX ORDER — PHENYLEPHRINE HCL IN 0.9% NACL 1 MG/10 ML
SYRINGE (ML) INTRAVENOUS
Status: DISCONTINUED | OUTPATIENT
Start: 2025-03-14 | End: 2025-03-14

## 2025-03-14 RX ORDER — HALOPERIDOL LACTATE 5 MG/ML
0.5 INJECTION, SOLUTION INTRAMUSCULAR EVERY 10 MIN PRN
Status: DISCONTINUED | OUTPATIENT
Start: 2025-03-14 | End: 2025-03-14 | Stop reason: HOSPADM

## 2025-03-14 RX ORDER — FENTANYL CITRATE 50 UG/ML
INJECTION, SOLUTION INTRAMUSCULAR; INTRAVENOUS
Status: DISCONTINUED | OUTPATIENT
Start: 2025-03-14 | End: 2025-03-14

## 2025-03-14 RX ORDER — CALCIUM CARBONATE 400(1000)
1 TABLET,CHEWABLE ORAL 2 TIMES DAILY WITH MEALS
COMMUNITY
Start: 2025-03-14 | End: 2026-03-14

## 2025-03-14 RX ORDER — HALOPERIDOL LACTATE 5 MG/ML
INJECTION, SOLUTION INTRAMUSCULAR
Status: DISCONTINUED | OUTPATIENT
Start: 2025-03-14 | End: 2025-03-14

## 2025-03-14 RX ORDER — SODIUM CHLORIDE 0.9 % (FLUSH) 0.9 %
10 SYRINGE (ML) INJECTION
Status: DISCONTINUED | OUTPATIENT
Start: 2025-03-14 | End: 2025-03-14 | Stop reason: HOSPADM

## 2025-03-14 RX ORDER — LIDOCAINE HYDROCHLORIDE 20 MG/ML
INJECTION, SOLUTION EPIDURAL; INFILTRATION; INTRACAUDAL; PERINEURAL
Status: DISCONTINUED | OUTPATIENT
Start: 2025-03-14 | End: 2025-03-14

## 2025-03-14 RX ORDER — ONDANSETRON HYDROCHLORIDE 2 MG/ML
INJECTION, SOLUTION INTRAVENOUS
Status: DISCONTINUED | OUTPATIENT
Start: 2025-03-14 | End: 2025-03-14

## 2025-03-14 RX ORDER — FENTANYL CITRATE 50 UG/ML
25 INJECTION, SOLUTION INTRAMUSCULAR; INTRAVENOUS EVERY 5 MIN PRN
Status: DISCONTINUED | OUTPATIENT
Start: 2025-03-14 | End: 2025-03-14 | Stop reason: HOSPADM

## 2025-03-14 RX ORDER — DEXMEDETOMIDINE HYDROCHLORIDE 100 UG/ML
INJECTION, SOLUTION INTRAVENOUS
Status: DISCONTINUED | OUTPATIENT
Start: 2025-03-14 | End: 2025-03-14

## 2025-03-14 RX ORDER — SUCCINYLCHOLINE CHLORIDE 20 MG/ML
INJECTION INTRAMUSCULAR; INTRAVENOUS
Status: DISCONTINUED | OUTPATIENT
Start: 2025-03-14 | End: 2025-03-14

## 2025-03-14 RX ORDER — ACETAMINOPHEN 500 MG
1000 TABLET ORAL ONCE
Status: COMPLETED | OUTPATIENT
Start: 2025-03-14 | End: 2025-03-14

## 2025-03-14 RX ORDER — IBUPROFEN 200 MG
600 TABLET ORAL EVERY 6 HOURS PRN
COMMUNITY
Start: 2025-03-14

## 2025-03-14 RX ORDER — DEXTROMETHORPHAN HYDROBROMIDE, GUAIFENESIN 5; 100 MG/5ML; MG/5ML
650 LIQUID ORAL EVERY 6 HOURS PRN
COMMUNITY
Start: 2025-03-14

## 2025-03-14 RX ORDER — PROPOFOL 10 MG/ML
VIAL (ML) INTRAVENOUS
Status: DISCONTINUED | OUTPATIENT
Start: 2025-03-14 | End: 2025-03-14

## 2025-03-14 RX ORDER — LABETALOL HCL 20 MG/4 ML
10 SYRINGE (ML) INTRAVENOUS
Status: DISCONTINUED | OUTPATIENT
Start: 2025-03-14 | End: 2025-03-14 | Stop reason: HOSPADM

## 2025-03-14 RX ORDER — BUPIVACAINE HYDROCHLORIDE 2.5 MG/ML
INJECTION, SOLUTION EPIDURAL; INFILTRATION; INTRACAUDAL; PERINEURAL
Status: DISCONTINUED | OUTPATIENT
Start: 2025-03-14 | End: 2025-03-14 | Stop reason: HOSPADM

## 2025-03-14 RX ORDER — SODIUM CHLORIDE 9 MG/ML
INJECTION, SOLUTION INTRAVENOUS CONTINUOUS
Status: DISCONTINUED | OUTPATIENT
Start: 2025-03-14 | End: 2025-03-14 | Stop reason: HOSPADM

## 2025-03-14 RX ORDER — LABETALOL HCL 20 MG/4 ML
SYRINGE (ML) INTRAVENOUS
Status: DISCONTINUED
Start: 2025-03-14 | End: 2025-03-14 | Stop reason: WASHOUT

## 2025-03-14 RX ORDER — DEXAMETHASONE SODIUM PHOSPHATE 4 MG/ML
INJECTION, SOLUTION INTRA-ARTICULAR; INTRALESIONAL; INTRAMUSCULAR; INTRAVENOUS; SOFT TISSUE
Status: DISCONTINUED | OUTPATIENT
Start: 2025-03-14 | End: 2025-03-14

## 2025-03-14 RX ORDER — MIDAZOLAM HYDROCHLORIDE 1 MG/ML
INJECTION INTRAMUSCULAR; INTRAVENOUS
Status: DISCONTINUED | OUTPATIENT
Start: 2025-03-14 | End: 2025-03-14

## 2025-03-14 RX ORDER — GLUCAGON 1 MG
1 KIT INJECTION
Status: DISCONTINUED | OUTPATIENT
Start: 2025-03-14 | End: 2025-03-14 | Stop reason: HOSPADM

## 2025-03-14 RX ADMIN — Medication 100 MCG: at 02:03

## 2025-03-14 RX ADMIN — PHENYLEPHRINE HYDROCHLORIDE 0.4 MCG/KG/MIN: 10 INJECTION INTRAVENOUS at 01:03

## 2025-03-14 RX ADMIN — SODIUM CHLORIDE, SODIUM GLUCONATE, SODIUM ACETATE, POTASSIUM CHLORIDE, MAGNESIUM CHLORIDE, SODIUM PHOSPHATE, DIBASIC, AND POTASSIUM PHOSPHATE: .53; .5; .37; .037; .03; .012; .00082 INJECTION, SOLUTION INTRAVENOUS at 01:03

## 2025-03-14 RX ADMIN — SUCCINYLCHOLINE CHLORIDE 160 MG: 20 INJECTION, SOLUTION INTRAMUSCULAR; INTRAVENOUS; PARENTERAL at 01:03

## 2025-03-14 RX ADMIN — MIDAZOLAM 2 MG: 1 INJECTION INTRAMUSCULAR; INTRAVENOUS at 01:03

## 2025-03-14 RX ADMIN — ACETAMINOPHEN 1000 MG: 500 TABLET ORAL at 10:03

## 2025-03-14 RX ADMIN — ONDANSETRON 4 MG: 2 INJECTION INTRAMUSCULAR; INTRAVENOUS at 03:03

## 2025-03-14 RX ADMIN — Medication 200 MCG: at 01:03

## 2025-03-14 RX ADMIN — DEXAMETHASONE SODIUM PHOSPHATE 8 MG: 4 INJECTION INTRA-ARTICULAR; INTRALESIONAL; INTRAMUSCULAR; INTRAVENOUS; SOFT TISSUE at 01:03

## 2025-03-14 RX ADMIN — PROPOFOL 150 MG: 10 INJECTION, EMULSION INTRAVENOUS at 01:03

## 2025-03-14 RX ADMIN — PROPOFOL 30 MG: 10 INJECTION, EMULSION INTRAVENOUS at 02:03

## 2025-03-14 RX ADMIN — FENTANYL CITRATE 25 MCG: 50 INJECTION INTRAMUSCULAR; INTRAVENOUS at 04:03

## 2025-03-14 RX ADMIN — DEXMEDETOMIDINE 12 MCG: 100 INJECTION, SOLUTION, CONCENTRATE INTRAVENOUS at 03:03

## 2025-03-14 RX ADMIN — Medication 100 MCG: at 03:03

## 2025-03-14 RX ADMIN — HALOPERIDOL LACTATE 1 MG: 5 INJECTION, SOLUTION INTRAMUSCULAR at 03:03

## 2025-03-14 RX ADMIN — FENTANYL CITRATE 50 MCG: 50 INJECTION INTRAMUSCULAR; INTRAVENOUS at 01:03

## 2025-03-14 RX ADMIN — PROPOFOL 20 MG: 10 INJECTION, EMULSION INTRAVENOUS at 01:03

## 2025-03-14 RX ADMIN — SODIUM CHLORIDE: 0.9 INJECTION, SOLUTION INTRAVENOUS at 01:03

## 2025-03-14 RX ADMIN — FENTANYL CITRATE 50 MCG: 50 INJECTION INTRAMUSCULAR; INTRAVENOUS at 03:03

## 2025-03-14 RX ADMIN — HALOPERIDOL LACTATE 0.5 MG: 5 INJECTION, SOLUTION INTRAMUSCULAR at 04:03

## 2025-03-14 RX ADMIN — Medication 200 MCG: at 02:03

## 2025-03-14 RX ADMIN — SODIUM CHLORIDE 0.2 MCG/KG/MIN: 9 INJECTION, SOLUTION INTRAVENOUS at 01:03

## 2025-03-14 RX ADMIN — LIDOCAINE HYDROCHLORIDE 60 MG: 20 INJECTION, SOLUTION EPIDURAL; INFILTRATION; INTRACAUDAL at 01:03

## 2025-03-14 NOTE — ANESTHESIA PROCEDURE NOTES
Intubation    Date/Time: 3/14/2025 1:34 PM    Performed by: Deandra Rodriguez MD  Authorized by: Enzo Early MD    Intubation:     Induction:  Intravenous    Intubated:  Postinduction    Mask Ventilation:  Easy mask    Attempts:  1    Attempted By:  Resident anesthesiologist    Method of Intubation:  Video laryngoscopy    Blade:  Rubin 3    Laryngeal View Grade: Grade I - full view of cords      Difficult Airway Encountered?: No      Complications:  None    Airway Device:  EMG ETT (NIMS)    Airway Device Size:  7.0    Style/Cuff Inflation:  Cuffed (inflated to minimal occlusive pressure)    Tube secured:  22    Secured at:  The teeth    Placement Verified By:  Capnometry    Complicating Factors:  None    Findings Post-Intubation:  BS equal bilateral and atraumatic/condition of teeth unchanged

## 2025-03-14 NOTE — ANESTHESIA PROCEDURE NOTES
Arterial    Diagnosis: hyperparathyroidism    Patient location during procedure: done in OR    Staffing  Authorizing Provider: Enzo Early MD  Performing Provider: Deandra Rodriguez MD    Staffing  Performed by: Deandra Rodriguez MD  Authorized by: Enzo Early MD    Anesthesiologist was present at the time of the procedure.    Preanesthetic Checklist  Completed: patient identified, IV checked, site marked, risks and benefits discussed, surgical consent, monitors and equipment checked, pre-op evaluation, timeout performed and anesthesia consent givenArterial  Skin Prep: chlorhexidine gluconate and isopropyl alcohol  Local Infiltration: none  Orientation: right  Location: radial    Catheter Size: 20 G  Catheter placement by Anatomical landmarks. Heme positive aspiration all ports. Insertion Attempts: 2  Assessment  Dressing: secured with tape and tegaderm  Patient: Tolerated well

## 2025-03-14 NOTE — PLAN OF CARE
Discharge instructions given and explained to patient and family with verbalized understanding. VSS.  Denies N/V, tolerating PO fluids. Voiding spontaneously. Rates pain level as tolerable. IVs removed. Pt left floor via W/C, stable for transport, responsible person (sister) available for transport home.

## 2025-03-14 NOTE — DISCHARGE INSTRUCTIONS
Post-Operative Instructions: Parathyroidectomy    NEW MEDICATIONS    Calcium Supplementation  Calcium Carbonate (Tums Ultra): 1 tablet, 2 times a day with a snack or meal  One Tums Ultra tablet has 1000 mg calcium carbonate which is equal to 400 mg elemental calcium.  *If you notice any numbness or tingling of the face, hands, or feet, take 2 extra tablets of Tums.  If symptoms do not subside within a half-hour, please call your surgeon's office.  Wait at least 4 hours after taking levothyroxine before taking the Tums.  Do NOT take your Tums in the morning of your lab draw.   Calcium carbonate (Tums) can cause constipation.  Please use over the counter stool softeners such as docusate (Colace) or laxatives such as polyethylene glycol (Miralax) to help avoid constipation.       PAIN CONTROL  Most discomfort after parathyroid surgery is from inflammation and swelling and is relieved with anti-inflammatory medication, throat lozenges or spray, and an ice pack.  Starting after surgery, take 650 mg of acetaminophen (Tylenol) every 6-8 hours.  Do not exceed 4000 mg per day.  Acetaminophen helps relieve inflammation.  Starting the day after your surgery, take 800 mg ibuprofen (Advil or Motrin) every 6-8 hours. Do not exceed 3200 mg per day.  Alternate between 650 mg acetaminophen and 800 mg ibuprofen every 3 hours for at least the first three days after surgery for pain control.  For example, take acetaminophen at 7 AM, then 3 hours later at 10 AM take ibuprofen, then 3 hours later at 1 PM take acetaminophen and 3 hours later at 4 PM take ibuprofen, etc...  Please use your ice pack for 30 minutes on / 30 minutes off for at least the first 3 days.  The ice pack helps reduce swelling.  Cepacol throat lozenges or spray can be used as needed for sore throat.  These can be purchased over the counter at the pharmacy.    HOME MEDICATIONS  You may resume taking your normal medications, with the EXCEPTION of the following:  Wait 3  days after your surgery before taking the following medications unless otherwise instructed by your surgeon or internist/cardiologist: Aspirin or aspirin containing medications (i.e. Goody's, Excedrin), Apixaban (Eliquis), Clopidogrel (Plavix), Dabigatran (Pradaxa), Dipyridamole (Aggrenox), Rivaroxaban (Xarelto), Warfarin (Coumadin), or any other type of blood thinner you may be taking.  Please wait 1 week after surgery to restart herbal medications, vitamins or minerals    INCISION CARE  Please use your ice pack for 30 minutes on / 30 minutes off for at least the first 3 days.  The ice pack helps reduce swelling.  You will be discharged from the hospital with your incision covered by skin glue that will remain on until your postoperative appointment.  It is normal to develop a lump under the incision, this is expected and is related to the healing process.  The lump will gradually go away with time.  Ok to shower when you return home after surgery  Please notify your physician if your incision is red, warm/hot, if you have an increase in swelling, any new drainage, or if you have a fever >101.5 F.  Please call 911 or proceed to the Emergency Room if you have difficulty breathing.    ACTIVITIES  You may resume normal activities, depending on your energy level.    Please refrain from driving for at least 3 days, or until you have complete mobility of your neck.    Do not drive if you are taking narcotic pain medication.    Please refrain from heavy lifting (10 lbs.) for 10 days.    RETURN TO WORK  Recovery after surgery depends on the individual.    Most patients can expect to return to work approximately 1-2 weeks after surgery.     DIET  You have no dietary or food restrictions.  You can eat the foods you normally eat.    Softer foods may be more comfortable to swallow for the first few days after surgery.    QUESTIONS OR CONCERNS  If you have any questions or concerns during the daytime, please send a [email or web address]  message to your surgeon or call the surgeon's office at [phone].    On nights and weekends, please call [phone] and ask for the General Surgery Resident on call.    For emergencies, difficulty breathing or shortness of breath, please call [phone], or report to the closest Emergency Department    Please notify your provider if you experience any of the following:  Bleeding, redness, warm to the touch, swelling, drainage from surgical incisions, bad smell from incision  Your pain level increases and does not improve with the pain medicines you have been given  Temperature greater than 101.5 F (38.1 C) degrees, chills  Inability to eat, drink fluids, or take medications  Nausea or vomiting  Dizziness or passing out  Develop a rash  Have pain or trouble urinating, or you pass blood in your urine  Develop a new cough  You are constipated or have diarrhea    UPCOMING APPOINTMENTS  Future Appointments   Date Time Provider Department Center   3/25/2025 12:00 PM LAB, DELFIN [facility] LAB [place]   3/27/2025  3:30 PM Trinity Hawkins MD [facility]   6/5/2025 10:40 AM Malcom Vigil NP [facility] at [facility]      Do NOT take your calcium supplements in the morning of your lab appointment.

## 2025-03-14 NOTE — BRIEF OP NOTE
[facility] - [facility] ([facility])  Brief Operative Note    Surgery Date: 3/14/2025     Surgeons and Role:     * Trinity Hawkins MD - Primary     * Enzo Brady MD - Resident - Chief    Assisting Surgeon: None    Pre-op Diagnosis:  Primary hyperparathyroidism [E21.0]    Post-op Diagnosis:  Post-Op Diagnosis Codes:     * Primary hyperparathyroidism [E21.0]    Procedure(s) (LRB):  PARATHYROIDECTOMY (Left)    Anesthesia: General    Operative Findings: left inferior parathyroidectomy performed with appropriate drop in PTH. Good nerve signal at the end of the case.     Estimated Blood Loss: 10 cc          Specimens:   Specimen (24h ago, onward)       Start     Ordered    03/14/25 1533  Specimen to Pathology, Surgery Thyroid, Parathyroid, and Adrenals  Once        Comments: Pre-op Diagnosis: Primary hyperparathyroidism [E21.0]Procedure(s):PARATHYROIDECTOMY Number of specimens: 2Name of specimens: 1. Left inferior parathyroid biopsy - frozen2. Left inferior parathyroid adenoma - permanent     References:    Click here for ordering Quick Tip   Question Answer Comment   Procedure Type: Thyroid, Parathyroid, and Adrenals    Release to patient Immediate        03/14/25 1536                      Discharge Note    OUTCOME: Patient tolerated treatment/procedure well without complication and is now ready for discharge.    DISPOSITION: Home or Self Care    FINAL DIAGNOSIS: hyperparathyroidism     FOLLOWUP: In clinic    DISCHARGE INSTRUCTIONS:    Discharge Procedure Orders   Diet Adult Regular     No driving until:   Order Comments: No longer taking narcotic pain medication and can turn around safely without pain.     Notify your health care provider if you experience any of the following:  temperature >100.4     Notify your health care provider if you experience any of the following:  severe uncontrolled pain     Notify your health care provider if you experience any of the following:  redness, tenderness, or signs of infection  (pain, swelling, redness, odor or green/yellow discharge around incision site)     Notify your health care provider if you experience any of the following:  difficulty breathing or increased cough     Notify your health care provider if you experience any of the following:  worsening rash     Notify your health care provider if you experience any of the following:  persistent dizziness, light-headedness, or visual disturbances     Notify your health care provider if you experience any of the following:  increased confusion or weakness     Activity as tolerated   Order Comments: Okay to shower the day after surgery. Please do not submerge wounds underwater (no bath, no pool, no lake, etc.) for at least 2 weeks.

## 2025-03-14 NOTE — TRANSFER OF CARE
Anesthesia Transfer of Care Note    Patient: Delgado Espinoza    Procedure(s) Performed: Procedure(s) (LRB):  PARATHYROIDECTOMY (Left)    Patient location: PACU    Anesthesia Type: general    Transport from OR: Transported from OR on 6-10 L/min O2 by face mask with adequate spontaneous ventilation    Post pain: adequate analgesia    Post assessment: no apparent anesthetic complications    Post vital signs: stable    Level of consciousness: awake    Nausea/Vomiting: no nausea/vomiting    Complications: none    Transfer of care protocol was followed      Last vitals: Visit Vitals  BP (!) 181/82 (BP Location: Right arm, Patient Position: Lying)   Pulse 81   Temp 36.6 °C (97.9 °F)   Resp 16   SpO2 100%   Breastfeeding No

## 2025-03-14 NOTE — OP NOTE
[facility]  Endocrine Surgery  Operative Report         Date of Procedure: 3/14/2025     Procedure: Procedure(s) (LRB):  PARATHYROIDECTOMY (Left)     Indications: This patient presents with primary hyperparathyroidism.  Preoperative imaging localizes to a likely left inferior parathyroid adenoma.  The patient now presents for a parathyroidectomy with intraoperative PTH monitoring.     Surgeons and Role:     * Trinity Hawkins MD - Primary     * Enzo Brady MD - Resident (PGY4)    Anesthesia:   Anesthesiologist: Enzo Early MD  CRNA: Nicola Polk CRNA  Anesthesia Resident: Deandra Rodriguez MD    OR Staff:   Circulator: Wilfrid Bueno RN  Relief Circulator: Rabia Wakefield RN; Veronica Park RN  Scrub Person: Reyna Weeks ST    Pre-Operative Diagnosis: Primary hyperparathyroidism [E21.0]    Post-Operative Diagnosis: Primary hyperparathyroidism [E21.0]    Anesthesia: General    Procedures:   Parathyroidectomy, unilateral left sided exploration, excision of left inferior parathyroid adenoma  Intraoperative monitoring and interpretation of left cranial nerves (vagus and recurrent laryngeal nerves) using the Neurovision Nerveana system and Neurovision Cobra EMG endotracheal tube  Intraoperative PTH level sampling and interpretation     Intraoperative Findings:   Left inferior parathyroid adenoma was identified in the upper anterior mediastinum.  Confirmed parathyroid tissue with frozen section, reported as nodular parathyroid tissue, 90% cellular.  Gland excised.  Left superior parathyroid gland identified deep to the midpole of the left thyroid lobe.  Gland remains in situ and marked with a small clip.  Appropriate decrease in PTH following excision of left inferior parathyroid adenoma.  Baseline PTH: 222.7, 10 minutes post-excision: 39.9.  The left recurrent laryngeal nerve and vagus nerve were identified and preserved.  Function was verified using the nerve monitoring  system.    Description of the Procedure:  The patient was seen in the Holding Room. The risks, benefits, complications, treatment options, and expected outcomes were discussed with the patient. The possibilities of reaction to medication, pulmonary aspiration, bleeding, infection, finding a normal parathyroid tissue, inability to identify all explored parathyroid glands, recurrent or persistent hyperparathyroidism, temporary or permanent hypocalcemia, temporary or permanent hypoparathyroidism, superior laryngeal or recurrent laryngeal nerve damage, the need for additional procedures, failure to diagnose a condition, creating a complication requiring transfusion or operation, stroke, death and imponderables. The patient concurred with the proposed plan and agreed to proceed despite the risks, giving informed consent.  The site of surgery was confirmed and marked. The patient was taken to Operating Room, identified as Delgado Espinoza and the procedure verified as parathyroidectomy with intraoperative PTH monitoring.     Induction of general anesthesia was performed, the patient was intubated with an electromyography endotracheal tube, and the anesthesia team placed all appropriate lines.  A baseline PTH level was drawn and resulted as 222.7.  The patient was positioned by the operating room, anesthesia, and surgical staff in a modified beachchair position with a shoulder roll, the neck supported in a neutral and slightly extended position, the arms padded and tucked.  An intraoperative ultrasound was performed prior to incision and identified a hypoechoic lesion inferior to the left thyroid pole just deep to the clavicle.  The surgical field was prepped and draped in standard sterile fashion.  A timeout was performed.  A 4 cm transverse cervical incision was created below the cricoid cartilage within a natural skin fold. Dissection was carried down through the platysma layer. Once this was completed, sub-platysmal  flaps were raised superiorly to the thyroid cartilage and inferiorly to the sternal notch. The strap muscles were identified and divided at the midline. Sharp and blunt dissection were used to mobilize the left thyroid lobe in a medial direction.  A vagus signal was confirmed with the nerve monitoring system.      The left inferior parathyroid gland was identified in the upper anterior mediastinum within the upper thymic tissue.  It appeared enlarged and adenomatous.  This was carefully dissected free circumferentially.  The polar vessels were identified and ligated with tiny clips.      A time zero PTH level was drawn at the time of left inferior specimen extraction, with subsequent levels at 5, 10, 15 and 20 minutes post-excision.  These resulted at time 0 = 110.4, 5 min = 60.0, 10 min = 39.9, 15 min = 33.3, and 20 min = 31.0.  Parathyroid tissue was confirmed with frozen section and reported as nodular and 90% cellular parathyroid tissue.    Dissection continued on the ipsilateral side and the left superior parathyroid gland was identified just deep to the midpole of the thyroid lobe.  The left superior parathyroid appeared to be normal in size and color and was marked with a small clip and preserved in situ.  Vagus nerve signal was confirmed with the nerve monitoring system.    The surgical bed was irrigated and inspected carefully. Multiple Valsalva maneuvers were performed at 30-40 cm of water and additional hemostasis was achieved as necessary with focal application of bipolar cautery. This was augmented with Fibrillar which was placed in the surgical bed.  Recurrent laryngeal and vagus nerve function was verified on the left using the nerve monitor prior to closure.  0.25% Marcaine was injected into the subcutaneous tissue of the incision for post-op analgesia. The strap muscles were then closed with interrupted 3-0 Vicryl suture.  The platysma was closed with interrupted 3-0 Vicryl suture, and the skin  incision was closed with a 6-0 Vicryl subcuticular knot-less closure. Sterile skin glue was applied to the incision.    A debrief was performed.  Specimens were confirmed.  Instrument, sponge, and needle counts were reported correct prior to closure and at the conclusion of the case.  The family was updated at the completion of the case.    Complications: No    Estimated Blood Loss (EBL): less than 50 mL           Drains: None    Implants: None    Specimens:   Specimen (24h ago, onward)       Start     Ordered    03/14/25 1533  Specimen to Pathology, Surgery Thyroid, Parathyroid, and Adrenals  Once        Comments: Pre-op Diagnosis: Primary hyperparathyroidism [E21.0]Procedure(s):PARATHYROIDECTOMY Number of specimens: 2Name of specimens: 1. Left inferior parathyroid biopsy - frozen2. Left inferior parathyroid adenoma - permanent     References:    Click here for ordering Quick Tip   Question Answer Comment   Procedure Type: Thyroid, Parathyroid, and Adrenals    Release to patient Immediate        03/14/25 1536                           Condition: stable    Disposition: PACU - hemodynamically stable.    Attestation: I was present and scrubbed for the entire procedure.

## 2025-03-14 NOTE — NURSING TRANSFER
Nursing Transfer Note      3/14/2025   5:27 PM    Nurse giving handoff:meghan peoples  Nurse receiving handoff:meghan garcia    Reason patient is being transferred: per md order    Transfer To: [facility]    Transfer via stretcher    Transfer with n/a    Transported by rn    Order for Tele Monitor? No    Patient belongings transferred with patient: No    Chart send with patient: Yes    Notified: sister

## 2025-03-16 NOTE — ANESTHESIA POSTPROCEDURE EVALUATION
Anesthesia Post Evaluation    Patient: Delgado Espinoza    Procedure(s) Performed: Procedure(s) (LRB):  PARATHYROIDECTOMY (Left)    Final Anesthesia Type: general      Patient location during evaluation: PACU  Patient participation: Yes- Able to Participate  Level of consciousness: awake and alert  Post-procedure vital signs: reviewed and stable  Pain management: adequate  Airway patency: patent    PONV status: None or treated.  Anesthetic complications: no      Cardiovascular status: hemodynamically stable  Respiratory status: spontaneous ventilation  Hydration status: euvolemic  Follow-up not needed.          Vitals Value Taken Time   /65 03/14/25 18:00   Temp 36.5 °C (97.7 °F) 03/14/25 17:30   Pulse 72 03/14/25 18:00   Resp 17 03/14/25 17:35   SpO2 100 % 03/14/25 18:00         Event Time   Out of Recovery 16:30:00         Pain/Carrie Score: No data recorded

## 2025-03-19 LAB
FINAL PATHOLOGIC DIAGNOSIS: NORMAL
FROZEN SECTION DIAGNOSIS: NORMAL
FROZEN SECTION FOOTNOTE: NORMAL
GROSS: NORMAL
Lab: NORMAL

## 2025-03-20 ENCOUNTER — RESULTS FOLLOW-UP (OUTPATIENT)
Facility: CLINIC | Age: 70
End: 2025-03-20

## 2025-03-25 ENCOUNTER — LAB VISIT (OUTPATIENT)
Dept: LAB | Facility: HOSPITAL | Age: 70
End: 2025-03-25
Attending: STUDENT IN AN ORGANIZED HEALTH CARE EDUCATION/TRAINING PROGRAM
Payer: MEDICARE

## 2025-03-25 DIAGNOSIS — Z98.890 S/P PARATHYROIDECTOMY: ICD-10-CM

## 2025-03-25 DIAGNOSIS — Z90.89 S/P PARATHYROIDECTOMY: ICD-10-CM

## 2025-03-25 LAB
ALBUMIN SERPL BCP-MCNC: 3.7 G/DL (ref 3.5–5.2)
ANION GAP (OHS): 8 MMOL/L (ref 8–16)
BUN SERPL-MCNC: 17 MG/DL (ref 8–23)
CALCIUM SERPL-MCNC: 9.6 MG/DL (ref 8.7–10.5)
CHLORIDE SERPL-SCNC: 108 MMOL/L (ref 95–110)
CO2 SERPL-SCNC: 26 MMOL/L (ref 23–29)
CREAT SERPL-MCNC: 0.9 MG/DL (ref 0.5–1.4)
GFR SERPLBLD CREATININE-BSD FMLA CKD-EPI: >60 ML/MIN/1.73/M2
GLUCOSE SERPL-MCNC: 82 MG/DL (ref 70–110)
PHOSPHATE SERPL-MCNC: 2.9 MG/DL (ref 2.7–4.5)
POTASSIUM SERPL-SCNC: 4.9 MMOL/L (ref 3.5–5.1)
SODIUM SERPL-SCNC: 142 MMOL/L (ref 136–145)

## 2025-03-25 PROCEDURE — 36415 COLL VENOUS BLD VENIPUNCTURE: CPT | Mod: PO

## 2025-03-25 PROCEDURE — 80069 RENAL FUNCTION PANEL: CPT

## 2025-03-26 ENCOUNTER — RESULTS FOLLOW-UP (OUTPATIENT)
Dept: SURGERY | Facility: CLINIC | Age: 70
End: 2025-03-26

## 2025-03-26 NOTE — PROGRESS NOTES
"Postoperative Endocrine Surgery Clinic Note    Reason for visit / Chief complaint: Postoperative evaluation  Endocrinologist: Nitesh  Procedure:  Parathyroidectomy - Left  Procedure Date: 3/14/2025    Subjective:     Delgado Espinoza returns today for postoperative evaluation, she is approximately 2 weeks post op.    Procedure:   Parathyroidectomy, left inferior adenoma; unilateral exploration     Operative findings:   Left inferior parathyroid adenoma was identified in the upper anterior mediastinum.  Confirmed parathyroid tissue with frozen section, reported as nodular parathyroid tissue, 90% cellular.  Gland excised.  Left superior parathyroid gland identified deep to the midpole of the left thyroid lobe.  Gland remains in situ and marked with a small clip.  Appropriate decrease in PTH following excision of left inferior parathyroid adenoma.  Baseline PTH: 222.7, 10 minutes post-excision: 39.9.  The left recurrent laryngeal nerve and vagus nerve were identified and preserved.  Function was verified using the nerve monitoring system.    Discharge medications:  - Calcium carbonate: 1 tablet, BID with meals   - Calcitriol: None    She has had an uncomplicated postoperative course. She denies signs or symptoms of hypocalcemia. Her phonation is at baseline.  Pain is well controlled.    Current Medications[1]  Review of patient's allergies indicates:  No Known Allergies    Review of Systems  Negative except as per HPI.     Objective:   BP (!) 146/76   Pulse 73   Ht 5' 6" (1.676 m)   Wt 73.8 kg (162 lb 9.4 oz)   SpO2 97%   BMI 26.24 kg/m²     General: alert, well appearing, and in no distress  Neck: neck is flat, no erythema or edema  Incision: well approximated, healing well  Neurological: phonation is normal    Labs:  Lab Results   Component Value Date    CALCIUM 9.6 03/25/2025    ALBUMIN 3.7 03/25/2025    PHOS 2.9 03/25/2025         Pathology:  Final Pathologic Diagnosis   Date Value Ref Range Status   03/14/2025 "   Final    1. PARATHYROID, LEFT INFERIOR, EXCISION:  - Hypercellular parathyroid, 0.060 g, with follicle formation, compatible with parathyroid adenoma.    2. PARATHYROID, LEFT INFERIOR, ADENOMA, EXCISION:  - Hypercellular parathyroid, 0.346 g, with follicle formation, compatible with parathyroid adenoma.       Comment:     Interp By uAre Mayorga MD, Signed on 03/19/2025 at 17:41         Assessment and Plan     S/P parathyroidectomy    Primary hyperparathyroidism  Assessment & Plan:  Primary hyperparathyroidism s/p parathyroidectomy (unilateral left sided exploration, excision of left inferior parathyroid adenoma) on 03/14/2025.  Doing well postoperatively.  Voice adequate.  No hypocalcemia symptoms.    - Discontinue calcium supplements  - Discontinue narcotics  - Reviewed pathology  - Incision care discussed, scar massage and routine scar care information provided  - Recommended daily dietary calcium intake equal to about 2616-1403 mg  - Recommend 2315-6956 IU vitamin D3 supplementation daily, available over the counter  - Obtain labs in six months to document cure of primary hyperparathyroidism with RFP, PTH and Vitamin D       Trinity Hawkins MD, FACS  Staff Surgeon  Endocrine Surgery  3/27/25           [1]   Current Outpatient Medications   Medication Sig Dispense Refill    amLODIPine (NORVASC) 5 MG tablet Take 1 tablet (5 mg total) by mouth once daily. (Patient taking differently: Take 5 mg by mouth every evening.) 90 tablet 1    aspirin 81 MG Chew Take 81 mg by mouth nightly.      fish oil-omega-3 fatty acids 300-1,000 mg capsule Take 2 g by mouth once daily.      fluticasone propionate (FLONASE) 50 mcg/actuation nasal spray 1 spray (50 mcg total) by Each Nostril route once daily. 9.9 mL 0    levocetirizine (XYZAL) 5 MG tablet TAKE 1 TABLET(5 MG) BY MOUTH EVERY EVENING 90 tablet 1    multivitamin (THERAGRAN) per tablet Take 1 tablet by mouth once daily.      olmesartan (BENICAR) 40 MG tablet Take 1 tablet  (40 mg total) by mouth once daily. (Patient taking differently: Take 40 mg by mouth every evening.) 90 tablet 1     No current facility-administered medications for this visit.

## 2025-03-27 ENCOUNTER — PATIENT MESSAGE (OUTPATIENT)
Dept: SURGERY | Facility: CLINIC | Age: 70
End: 2025-03-27

## 2025-03-27 ENCOUNTER — OFFICE VISIT (OUTPATIENT)
Dept: SURGERY | Facility: CLINIC | Age: 70
End: 2025-03-27
Payer: MEDICARE

## 2025-03-27 VITALS
SYSTOLIC BLOOD PRESSURE: 146 MMHG | DIASTOLIC BLOOD PRESSURE: 76 MMHG | HEART RATE: 73 BPM | BODY MASS INDEX: 26.13 KG/M2 | WEIGHT: 162.56 LBS | HEIGHT: 66 IN | OXYGEN SATURATION: 97 %

## 2025-03-27 DIAGNOSIS — Z90.89 S/P PARATHYROIDECTOMY: Primary | ICD-10-CM

## 2025-03-27 DIAGNOSIS — Z98.890 S/P PARATHYROIDECTOMY: Primary | ICD-10-CM

## 2025-03-27 DIAGNOSIS — E21.0 PRIMARY HYPERPARATHYROIDISM: ICD-10-CM

## 2025-03-27 PROCEDURE — 3078F DIAST BP <80 MM HG: CPT | Mod: CPTII,S$GLB,, | Performed by: STUDENT IN AN ORGANIZED HEALTH CARE EDUCATION/TRAINING PROGRAM

## 2025-03-27 PROCEDURE — 3077F SYST BP >= 140 MM HG: CPT | Mod: CPTII,S$GLB,, | Performed by: STUDENT IN AN ORGANIZED HEALTH CARE EDUCATION/TRAINING PROGRAM

## 2025-03-27 PROCEDURE — 1101F PT FALLS ASSESS-DOCD LE1/YR: CPT | Mod: CPTII,S$GLB,, | Performed by: STUDENT IN AN ORGANIZED HEALTH CARE EDUCATION/TRAINING PROGRAM

## 2025-03-27 PROCEDURE — 4010F ACE/ARB THERAPY RXD/TAKEN: CPT | Mod: CPTII,S$GLB,, | Performed by: STUDENT IN AN ORGANIZED HEALTH CARE EDUCATION/TRAINING PROGRAM

## 2025-03-27 PROCEDURE — 1126F AMNT PAIN NOTED NONE PRSNT: CPT | Mod: CPTII,S$GLB,, | Performed by: STUDENT IN AN ORGANIZED HEALTH CARE EDUCATION/TRAINING PROGRAM

## 2025-03-27 PROCEDURE — 3288F FALL RISK ASSESSMENT DOCD: CPT | Mod: CPTII,S$GLB,, | Performed by: STUDENT IN AN ORGANIZED HEALTH CARE EDUCATION/TRAINING PROGRAM

## 2025-03-27 PROCEDURE — 1159F MED LIST DOCD IN RCRD: CPT | Mod: CPTII,S$GLB,, | Performed by: STUDENT IN AN ORGANIZED HEALTH CARE EDUCATION/TRAINING PROGRAM

## 2025-03-27 PROCEDURE — 99999 PR PBB SHADOW E&M-EST. PATIENT-LVL III: CPT | Mod: PBBFAC,,, | Performed by: STUDENT IN AN ORGANIZED HEALTH CARE EDUCATION/TRAINING PROGRAM

## 2025-03-27 PROCEDURE — 99024 POSTOP FOLLOW-UP VISIT: CPT | Mod: S$GLB,,, | Performed by: STUDENT IN AN ORGANIZED HEALTH CARE EDUCATION/TRAINING PROGRAM

## 2025-03-27 NOTE — ASSESSMENT & PLAN NOTE
Primary hyperparathyroidism s/p parathyroidectomy (unilateral left sided exploration, excision of left inferior parathyroid adenoma) on 03/14/2025.  Doing well postoperatively.  Voice adequate.  No hypocalcemia symptoms.    - Discontinue calcium supplements  - Discontinue narcotics  - Reviewed pathology  - Incision care discussed, scar massage and routine scar care information provided  - Recommended daily dietary calcium intake equal to about 7091-2114 mg  - Recommend 8606-5802 IU vitamin D3 supplementation daily, available over the counter  - Obtain labs in six months to document cure of primary hyperparathyroidism with RFP, PTH and Vitamin D

## 2025-05-20 ENCOUNTER — PATIENT MESSAGE (OUTPATIENT)
Dept: RESEARCH | Facility: HOSPITAL | Age: 70
End: 2025-05-20
Payer: MEDICARE

## 2025-06-02 ENCOUNTER — TELEPHONE (OUTPATIENT)
Dept: FAMILY MEDICINE | Facility: CLINIC | Age: 70
End: 2025-06-02
Payer: MEDICARE

## 2025-06-02 DIAGNOSIS — E55.9 VITAMIN D DEFICIENCY: ICD-10-CM

## 2025-06-02 DIAGNOSIS — I10 ESSENTIAL HYPERTENSION: ICD-10-CM

## 2025-06-02 DIAGNOSIS — E83.52 SERUM CALCIUM ELEVATED: ICD-10-CM

## 2025-06-02 DIAGNOSIS — Z79.899 ENCOUNTER FOR LONG-TERM (CURRENT) USE OF MEDICATIONS: Primary | ICD-10-CM

## 2025-06-02 DIAGNOSIS — Z00.00 PREVENTATIVE HEALTH CARE: ICD-10-CM

## 2025-06-05 ENCOUNTER — RESULTS FOLLOW-UP (OUTPATIENT)
Dept: FAMILY MEDICINE | Facility: CLINIC | Age: 70
End: 2025-06-05

## 2025-06-05 ENCOUNTER — OFFICE VISIT (OUTPATIENT)
Dept: FAMILY MEDICINE | Facility: CLINIC | Age: 70
End: 2025-06-05
Payer: MEDICARE

## 2025-06-05 VITALS
OXYGEN SATURATION: 99 % | BODY MASS INDEX: 26.2 KG/M2 | DIASTOLIC BLOOD PRESSURE: 82 MMHG | HEIGHT: 66 IN | SYSTOLIC BLOOD PRESSURE: 130 MMHG | HEART RATE: 81 BPM | WEIGHT: 163 LBS

## 2025-06-05 DIAGNOSIS — Z90.89 S/P PARATHYROIDECTOMY: ICD-10-CM

## 2025-06-05 DIAGNOSIS — Z98.890 S/P PARATHYROIDECTOMY: ICD-10-CM

## 2025-06-05 DIAGNOSIS — I10 ESSENTIAL HYPERTENSION: ICD-10-CM

## 2025-06-05 DIAGNOSIS — R21 RASH: Primary | ICD-10-CM

## 2025-06-05 LAB
ALBUMIN SERPL-MCNC: 4.3 G/DL (ref 3.6–5.1)
ALBUMIN/GLOB SERPL: 1.4 (CALC) (ref 1–2.5)
ALP SERPL-CCNC: 95 U/L (ref 37–153)
ALT SERPL-CCNC: 17 U/L (ref 6–29)
AST SERPL-CCNC: 19 U/L (ref 10–35)
BASOPHILS # BLD AUTO: 40 CELLS/UL (ref 0–200)
BASOPHILS NFR BLD AUTO: 0.6 %
BILIRUB SERPL-MCNC: 0.3 MG/DL (ref 0.2–1.2)
BUN SERPL-MCNC: 17 MG/DL (ref 7–25)
BUN/CREAT SERPL: NORMAL (CALC) (ref 6–22)
CALCIUM SERPL-MCNC: 9.2 MG/DL (ref 8.6–10.4)
CHLORIDE SERPL-SCNC: 106 MMOL/L (ref 98–110)
CHOLEST SERPL-MCNC: 161 MG/DL
CHOLEST/HDLC SERPL: 2.9 (CALC)
CO2 SERPL-SCNC: 27 MMOL/L (ref 20–32)
CREAT SERPL-MCNC: 0.79 MG/DL (ref 0.6–1)
EGFR: 80 ML/MIN/1.73M2
EOSINOPHIL # BLD AUTO: 488 CELLS/UL (ref 15–500)
EOSINOPHIL NFR BLD AUTO: 7.4 %
ERYTHROCYTE [DISTWIDTH] IN BLOOD BY AUTOMATED COUNT: 16.3 % (ref 11–15)
GLOBULIN SER CALC-MCNC: 3.1 G/DL (CALC) (ref 1.9–3.7)
GLUCOSE SERPL-MCNC: 84 MG/DL (ref 65–99)
HCT VFR BLD AUTO: 42.4 % (ref 35–45)
HDLC SERPL-MCNC: 56 MG/DL
HGB BLD-MCNC: 13 G/DL (ref 11.7–15.5)
LDLC SERPL CALC-MCNC: 94 MG/DL (CALC)
LYMPHOCYTES # BLD AUTO: 1663 CELLS/UL (ref 850–3900)
LYMPHOCYTES NFR BLD AUTO: 25.2 %
MCH RBC QN AUTO: 25.3 PG (ref 27–33)
MCHC RBC AUTO-ENTMCNC: 30.7 G/DL (ref 32–36)
MCV RBC AUTO: 82.5 FL (ref 80–100)
MONOCYTES # BLD AUTO: 455 CELLS/UL (ref 200–950)
MONOCYTES NFR BLD AUTO: 6.9 %
NEUTROPHILS # BLD AUTO: 3953 CELLS/UL (ref 1500–7800)
NEUTROPHILS NFR BLD AUTO: 59.9 %
NONHDLC SERPL-MCNC: 105 MG/DL (CALC)
PLATELET # BLD AUTO: 282 THOUSAND/UL (ref 140–400)
PMV BLD REES-ECKER: 10.6 FL (ref 7.5–12.5)
POTASSIUM SERPL-SCNC: 4.7 MMOL/L (ref 3.5–5.3)
PROT SERPL-MCNC: 7.4 G/DL (ref 6.1–8.1)
RBC # BLD AUTO: 5.14 MILLION/UL (ref 3.8–5.1)
SODIUM SERPL-SCNC: 140 MMOL/L (ref 135–146)
TRIGL SERPL-MCNC: 39 MG/DL
WBC # BLD AUTO: 6.6 THOUSAND/UL (ref 3.8–10.8)

## 2025-06-05 PROCEDURE — 3075F SYST BP GE 130 - 139MM HG: CPT | Mod: CPTII,S$GLB,, | Performed by: NURSE PRACTITIONER

## 2025-06-05 PROCEDURE — 1160F RVW MEDS BY RX/DR IN RCRD: CPT | Mod: CPTII,S$GLB,, | Performed by: NURSE PRACTITIONER

## 2025-06-05 PROCEDURE — 1159F MED LIST DOCD IN RCRD: CPT | Mod: CPTII,S$GLB,, | Performed by: NURSE PRACTITIONER

## 2025-06-05 PROCEDURE — 3288F FALL RISK ASSESSMENT DOCD: CPT | Mod: CPTII,S$GLB,, | Performed by: NURSE PRACTITIONER

## 2025-06-05 PROCEDURE — 3079F DIAST BP 80-89 MM HG: CPT | Mod: CPTII,S$GLB,, | Performed by: NURSE PRACTITIONER

## 2025-06-05 PROCEDURE — 4010F ACE/ARB THERAPY RXD/TAKEN: CPT | Mod: CPTII,S$GLB,, | Performed by: NURSE PRACTITIONER

## 2025-06-05 PROCEDURE — 99214 OFFICE O/P EST MOD 30 MIN: CPT | Mod: S$GLB,,, | Performed by: NURSE PRACTITIONER

## 2025-06-05 PROCEDURE — 1101F PT FALLS ASSESS-DOCD LE1/YR: CPT | Mod: CPTII,S$GLB,, | Performed by: NURSE PRACTITIONER

## 2025-06-05 PROCEDURE — 3008F BODY MASS INDEX DOCD: CPT | Mod: CPTII,S$GLB,, | Performed by: NURSE PRACTITIONER

## 2025-06-05 RX ORDER — AMLODIPINE BESYLATE 5 MG/1
5 TABLET ORAL DAILY
Qty: 90 TABLET | Refills: 1 | Status: SHIPPED | OUTPATIENT
Start: 2025-06-05

## 2025-06-05 RX ORDER — OLMESARTAN MEDOXOMIL 40 MG/1
40 TABLET ORAL DAILY
Qty: 90 TABLET | Refills: 1 | Status: SHIPPED | OUTPATIENT
Start: 2025-06-05

## 2025-06-05 RX ORDER — CLOTRIMAZOLE AND BETAMETHASONE DIPROPIONATE 10; .64 MG/G; MG/G
CREAM TOPICAL 2 TIMES DAILY
Qty: 45 G | Refills: 0 | Status: SHIPPED | OUTPATIENT
Start: 2025-06-05 | End: 2025-06-12

## 2025-06-23 DIAGNOSIS — Z00.00 ENCOUNTER FOR MEDICARE ANNUAL WELLNESS EXAM: ICD-10-CM

## (undated) DEVICE — ELECTRODE REM PLYHSV RETURN 9

## (undated) DEVICE — ELECTRODE MEGADYNE RETURN DUAL

## (undated) DEVICE — TRAY MINOR GEN SURG OMC

## (undated) DEVICE — SUT VICRYL 6-0 P1 18IN UD

## (undated) DEVICE — ELECTRODE BLADE INSULATED 1 IN

## (undated) DEVICE — DRESSING TRANS 4X4 TEGADERM

## (undated) DEVICE — CORD BIPOLAR 12 FOOT

## (undated) DEVICE — DRAPE INSTR MAGNETIC 10X16IN

## (undated) DEVICE — GAUZE SPONGE PEANUT STRL

## (undated) DEVICE — SUT VICRYL 3-0 27 SH

## (undated) DEVICE — SYR 10CC LUER LOCK

## (undated) DEVICE — CONTAINER SPECIMEN OR STER 4OZ

## (undated) DEVICE — DRAPE CORETEMP FLD WRM 56X62IN

## (undated) DEVICE — DRAPE EENT SPLIT STERILE

## (undated) DEVICE — PENCIL ROCKER SWITCH 10FT CORD

## (undated) DEVICE — ADHESIVE DERMABOND ADVANCED

## (undated) DEVICE — CHLORAPREP 10.5 ML APPLICATOR

## (undated) DEVICE — DRAPE STERI INSTRUMENT 1018

## (undated) DEVICE — SUT 4-0 12-18IN SILK BLACK

## (undated) DEVICE — SUT 3-0 12-18IN SILK

## (undated) DEVICE — MARKER SKIN RULER STERILE

## (undated) DEVICE — PAD CURAD NONADH 3X4IN

## (undated) DEVICE — CLIP LIGACLIP XTRA TITANIUM

## (undated) DEVICE — SUT LIGACLIP SMALL XTRA

## (undated) DEVICE — DRAPE HALF SURGICAL 40X58IN